# Patient Record
Sex: MALE | Race: WHITE | NOT HISPANIC OR LATINO | Employment: FULL TIME | URBAN - METROPOLITAN AREA
[De-identification: names, ages, dates, MRNs, and addresses within clinical notes are randomized per-mention and may not be internally consistent; named-entity substitution may affect disease eponyms.]

---

## 2023-07-12 LAB
ALANINE AMINOTRANSFERASE (SGPT) (U/L) IN SER/PLAS: 28 U/L (ref 10–52)
ALBUMIN (G/DL) IN SER/PLAS: 4.3 G/DL (ref 3.4–5)
ALKALINE PHOSPHATASE (U/L) IN SER/PLAS: 83 U/L (ref 33–136)
ANION GAP IN SER/PLAS: 13 MMOL/L (ref 10–20)
ASPARTATE AMINOTRANSFERASE (SGOT) (U/L) IN SER/PLAS: 14 U/L (ref 9–39)
BILIRUBIN TOTAL (MG/DL) IN SER/PLAS: 0.3 MG/DL (ref 0–1.2)
CALCIUM (MG/DL) IN SER/PLAS: 10.1 MG/DL (ref 8.6–10.3)
CARBON DIOXIDE, TOTAL (MMOL/L) IN SER/PLAS: 26 MMOL/L (ref 21–32)
CHLORIDE (MMOL/L) IN SER/PLAS: 105 MMOL/L (ref 98–107)
CHOLESTEROL (MG/DL) IN SER/PLAS: 157 MG/DL (ref 0–199)
CHOLESTEROL IN HDL (MG/DL) IN SER/PLAS: 45.7 MG/DL
CHOLESTEROL/HDL RATIO: 3.4
CREATININE (MG/DL) IN SER/PLAS: 0.77 MG/DL (ref 0.5–1.3)
ERYTHROCYTE DISTRIBUTION WIDTH (RATIO) BY AUTOMATED COUNT: 13.7 % (ref 11.5–14.5)
ERYTHROCYTE MEAN CORPUSCULAR HEMOGLOBIN CONCENTRATION (G/DL) BY AUTOMATED: 31.7 G/DL (ref 32–36)
ERYTHROCYTE MEAN CORPUSCULAR VOLUME (FL) BY AUTOMATED COUNT: 91 FL (ref 80–100)
ERYTHROCYTES (10*6/UL) IN BLOOD BY AUTOMATED COUNT: 4.61 X10E12/L (ref 4.5–5.9)
ESTIMATED AVERAGE GLUCOSE FOR HBA1C: 143 MG/DL
GFR MALE: >90 ML/MIN/1.73M2
GLUCOSE (MG/DL) IN SER/PLAS: 105 MG/DL (ref 74–99)
HEMATOCRIT (%) IN BLOOD BY AUTOMATED COUNT: 41.9 % (ref 41–52)
HEMOGLOBIN (G/DL) IN BLOOD: 13.3 G/DL (ref 13.5–17.5)
HEMOGLOBIN A1C/HEMOGLOBIN TOTAL IN BLOOD: 6.6 %
LDL: 58 MG/DL (ref 0–99)
LEUKOCYTES (10*3/UL) IN BLOOD BY AUTOMATED COUNT: 9.5 X10E9/L (ref 4.4–11.3)
NON HDL CHOLESTEROL: 111 MG/DL
NRBC (PER 100 WBCS) BY AUTOMATED COUNT: 0 /100 WBC (ref 0–0)
PLATELETS (10*3/UL) IN BLOOD AUTOMATED COUNT: 384 X10E9/L (ref 150–450)
POTASSIUM (MMOL/L) IN SER/PLAS: 4.5 MMOL/L (ref 3.5–5.3)
PROSTATE SPECIFIC ANTIGEN,SCREEN: 0.23 NG/ML (ref 0–4)
PROTEIN TOTAL: 7.4 G/DL (ref 6.4–8.2)
SODIUM (MMOL/L) IN SER/PLAS: 139 MMOL/L (ref 136–145)
TRIGLYCERIDE (MG/DL) IN SER/PLAS: 267 MG/DL (ref 0–149)
UREA NITROGEN (MG/DL) IN SER/PLAS: 16 MG/DL (ref 6–23)
VLDL: 53 MG/DL (ref 0–40)

## 2024-01-08 DIAGNOSIS — E11.9 TYPE 2 DIABETES MELLITUS WITHOUT COMPLICATION, WITHOUT LONG-TERM CURRENT USE OF INSULIN (MULTI): ICD-10-CM

## 2024-01-08 DIAGNOSIS — E11.9 TYPE 2 DIABETES MELLITUS WITHOUT COMPLICATION, WITHOUT LONG-TERM CURRENT USE OF INSULIN (MULTI): Primary | ICD-10-CM

## 2024-01-08 RX ORDER — BLOOD-GLUCOSE METER
KIT MISCELLANEOUS
Qty: 100 STRIP | Refills: 3 | Status: SHIPPED | OUTPATIENT
Start: 2024-01-08 | End: 2024-01-08 | Stop reason: SDUPTHER

## 2024-01-08 RX ORDER — BLOOD-GLUCOSE METER
KIT MISCELLANEOUS
Qty: 100 STRIP | Refills: 3 | Status: ON HOLD | OUTPATIENT
Start: 2024-01-08

## 2024-01-19 ENCOUNTER — TELEPHONE (OUTPATIENT)
Dept: PRIMARY CARE | Facility: CLINIC | Age: 59
End: 2024-01-19
Payer: COMMERCIAL

## 2024-01-19 DIAGNOSIS — Z12.11 SCREENING FOR COLON CANCER: Primary | ICD-10-CM

## 2024-01-23 ENCOUNTER — TELEPHONE (OUTPATIENT)
Dept: GASTROENTEROLOGY | Facility: HOSPITAL | Age: 59
End: 2024-01-23
Payer: COMMERCIAL

## 2024-01-23 DIAGNOSIS — Z12.11 ENCOUNTER FOR SCREENING FOR MALIGNANT NEOPLASM OF COLON: Primary | ICD-10-CM

## 2024-01-23 RX ORDER — SODIUM, POTASSIUM,MAG SULFATES 17.5-3.13G
1 SOLUTION, RECONSTITUTED, ORAL ORAL 2 TIMES DAILY
Qty: 354 ML | Refills: 0 | Status: SHIPPED
Start: 2024-01-23 | End: 2024-05-31 | Stop reason: WASHOUT

## 2024-01-23 NOTE — TELEPHONE ENCOUNTER
----- Message from Pao Cardenas sent at 1/19/2024  3:30 PM EST -----  Regarding: Prep request  Pt scheduled Colonoscopy on 07/11 with Dr mcbride. Can you please send prep to [facility] pharmacy [phone]      Thank you

## 2024-05-31 ENCOUNTER — OFFICE VISIT (OUTPATIENT)
Dept: PRIMARY CARE | Facility: CLINIC | Age: 59
End: 2024-05-31
Payer: COMMERCIAL

## 2024-05-31 VITALS
HEIGHT: 72 IN | WEIGHT: 223 LBS | BODY MASS INDEX: 30.2 KG/M2 | OXYGEN SATURATION: 95 % | DIASTOLIC BLOOD PRESSURE: 82 MMHG | SYSTOLIC BLOOD PRESSURE: 127 MMHG | HEART RATE: 103 BPM

## 2024-05-31 DIAGNOSIS — R22.9 LUMP OF SKIN: Primary | ICD-10-CM

## 2024-05-31 PROBLEM — I10 BENIGN ESSENTIAL HYPERTENSION: Status: ACTIVE | Noted: 2024-05-31

## 2024-05-31 PROBLEM — E78.5 HYPERLIPIDEMIA: Status: ACTIVE | Noted: 2024-05-31

## 2024-05-31 PROBLEM — E11.9 TYPE 2 DIABETES MELLITUS WITHOUT COMPLICATION, WITHOUT LONG-TERM CURRENT USE OF INSULIN (MULTI): Status: ACTIVE | Noted: 2024-05-31

## 2024-05-31 PROCEDURE — 1036F TOBACCO NON-USER: CPT | Performed by: INTERNAL MEDICINE

## 2024-05-31 PROCEDURE — 3079F DIAST BP 80-89 MM HG: CPT | Performed by: INTERNAL MEDICINE

## 2024-05-31 PROCEDURE — 4010F ACE/ARB THERAPY RXD/TAKEN: CPT | Performed by: INTERNAL MEDICINE

## 2024-05-31 PROCEDURE — 3074F SYST BP LT 130 MM HG: CPT | Performed by: INTERNAL MEDICINE

## 2024-05-31 PROCEDURE — 99213 OFFICE O/P EST LOW 20 MIN: CPT | Performed by: INTERNAL MEDICINE

## 2024-05-31 RX ORDER — MELOXICAM 15 MG/1
1 TABLET ORAL DAILY
Status: ON HOLD | COMMUNITY
Start: 2022-04-04 | End: 2024-06-10 | Stop reason: ALTCHOICE

## 2024-05-31 RX ORDER — METFORMIN HYDROCHLORIDE 500 MG/1
1 TABLET ORAL 2 TIMES DAILY
Status: ON HOLD | COMMUNITY
Start: 2018-01-11

## 2024-05-31 RX ORDER — ASCORBIC ACID 500 MG
500 TABLET ORAL
Status: ON HOLD | COMMUNITY
End: 2024-06-10 | Stop reason: ALTCHOICE

## 2024-05-31 RX ORDER — LANSOPRAZOLE 30 MG/1
30 CAPSULE, DELAYED RELEASE ORAL
Status: ON HOLD | COMMUNITY
End: 2024-06-10 | Stop reason: ALTCHOICE

## 2024-05-31 RX ORDER — LISINOPRIL 20 MG/1
1 TABLET ORAL DAILY
Status: ON HOLD | COMMUNITY
Start: 2016-11-23

## 2024-05-31 RX ORDER — ATORVASTATIN CALCIUM 80 MG/1
1 TABLET, FILM COATED ORAL DAILY
Status: ON HOLD | COMMUNITY
Start: 2015-06-24

## 2024-05-31 ASSESSMENT — PATIENT HEALTH QUESTIONNAIRE - PHQ9
1. LITTLE INTEREST OR PLEASURE IN DOING THINGS: NOT AT ALL
SUM OF ALL RESPONSES TO PHQ9 QUESTIONS 1 & 2: 0
2. FEELING DOWN, DEPRESSED OR HOPELESS: NOT AT ALL

## 2024-05-31 NOTE — PROGRESS NOTES
Subjective   Patient ID: Saul Colon is a 59 y.o. male who presents for Mass (Lump on side).    HPI   Has lump over left side starting about 5 days ago. Felt like it was a boil at first, but didn't come to a head. Tried some warm compresses, didn't help.  The next few days seemed to enlarge. But it is nontender. Not warm to touch.  No fevers. The past two days it has not changed in size. Remains nontender. No fevers.      Review of Systems    Objective   /82 (BP Location: Right arm)   Pulse 103   Ht 1.829 m (6')   Wt 101 kg (223 lb)   SpO2 95%   BMI 30.24 kg/m²     Physical Exam  ~2cm x 4cm firm subcutaneous lump R lateral side just adjacent to lowest rib. Lump is without induration, fluctuance, warmth, or overlying redness.  Difficult to tell if lump is adhered to lower rib as it is minimally mobile. It is nontender.    Assessment/Plan   Problem List Items Addressed This Visit             ICD-10-CM    Lump of skin - Primary R22.9    Relevant Orders    US abdomen limited          R lateral abdominal lump - Unclear. This does not look like an abscess. Will get an ultrasound. Advised to monitor, if enlarging, getting warm or tender, or fevers, then go to the ED in the mean time.

## 2024-06-01 ENCOUNTER — HOSPITAL ENCOUNTER (OUTPATIENT)
Dept: RADIOLOGY | Facility: HOSPITAL | Age: 59
Discharge: HOME | End: 2024-06-01
Payer: COMMERCIAL

## 2024-06-01 DIAGNOSIS — R22.9 LUMP OF SKIN: ICD-10-CM

## 2024-06-01 PROCEDURE — 76705 ECHO EXAM OF ABDOMEN: CPT

## 2024-06-01 PROCEDURE — 76705 ECHO EXAM OF ABDOMEN: CPT | Performed by: RADIOLOGY

## 2024-06-04 ENCOUNTER — APPOINTMENT (OUTPATIENT)
Dept: RADIOLOGY | Facility: CLINIC | Age: 59
End: 2024-06-04
Payer: COMMERCIAL

## 2024-06-04 DIAGNOSIS — R22.9 LUMP OF SKIN: Primary | ICD-10-CM

## 2024-06-05 ENCOUNTER — TELEPHONE (OUTPATIENT)
Dept: PRIMARY CARE | Facility: CLINIC | Age: 59
End: 2024-06-05
Payer: COMMERCIAL

## 2024-06-05 NOTE — TELEPHONE ENCOUNTER
Spoke to patient to give results : per dr maxwell Ultrasound didn't completely show what the lump could be and the Radiologist is recommending getting a CT. I ordered the CT, this will be with contrast so we will need to get blood work to verify kidneys are normal for the contrast, the blood work was ordered too.

## 2024-06-05 NOTE — TELEPHONE ENCOUNTER
----- Message from Josse Al MD sent at 6/4/2024  5:18 PM EDT -----  Ultrasound didn't completely show what the lump could be and the Radiologist is recommending getting a CT. I ordered the CT, this will be with contrast so we will need to get blood work to verify kidneys are normal for the contrast, the blood work was ordered too.

## 2024-06-06 ENCOUNTER — LAB (OUTPATIENT)
Dept: LAB | Facility: LAB | Age: 59
End: 2024-06-06
Payer: COMMERCIAL

## 2024-06-06 DIAGNOSIS — R22.9 LUMP OF SKIN: ICD-10-CM

## 2024-06-06 LAB
CREAT SERPL-MCNC: 0.8 MG/DL (ref 0.5–1.3)
EGFRCR SERPLBLD CKD-EPI 2021: >90 ML/MIN/1.73M*2

## 2024-06-06 PROCEDURE — 82565 ASSAY OF CREATININE: CPT

## 2024-06-06 PROCEDURE — 36415 COLL VENOUS BLD VENIPUNCTURE: CPT

## 2024-06-09 ENCOUNTER — APPOINTMENT (OUTPATIENT)
Dept: RADIOLOGY | Facility: HOSPITAL | Age: 59
End: 2024-06-09
Payer: COMMERCIAL

## 2024-06-09 ENCOUNTER — HOSPITAL ENCOUNTER (INPATIENT)
Facility: HOSPITAL | Age: 59
LOS: 5 days | Discharge: HOME | End: 2024-06-14
Attending: EMERGENCY MEDICINE | Admitting: STUDENT IN AN ORGANIZED HEALTH CARE EDUCATION/TRAINING PROGRAM
Payer: COMMERCIAL

## 2024-06-09 DIAGNOSIS — L02.213 CHEST WALL ABSCESS: Primary | ICD-10-CM

## 2024-06-09 DIAGNOSIS — I70.90 ATHEROSCLEROSIS: ICD-10-CM

## 2024-06-09 DIAGNOSIS — R91.1 LUNG NODULE: ICD-10-CM

## 2024-06-09 LAB
ALBUMIN SERPL BCP-MCNC: 4.7 G/DL (ref 3.4–5)
ALP SERPL-CCNC: 86 U/L (ref 33–136)
ALT SERPL W P-5'-P-CCNC: 27 U/L (ref 10–52)
ANION GAP SERPL CALC-SCNC: 15 MMOL/L (ref 10–20)
AST SERPL W P-5'-P-CCNC: 18 U/L (ref 9–39)
BASOPHILS # BLD AUTO: 0.05 X10*3/UL (ref 0–0.1)
BASOPHILS NFR BLD AUTO: 0.5 %
BILIRUB SERPL-MCNC: 0.4 MG/DL (ref 0–1.2)
BUN SERPL-MCNC: 17 MG/DL (ref 6–23)
CALCIUM SERPL-MCNC: 10.1 MG/DL (ref 8.6–10.3)
CHLORIDE SERPL-SCNC: 98 MMOL/L (ref 98–107)
CO2 SERPL-SCNC: 26 MMOL/L (ref 21–32)
CREAT SERPL-MCNC: 0.93 MG/DL (ref 0.5–1.3)
EGFRCR SERPLBLD CKD-EPI 2021: >90 ML/MIN/1.73M*2
EOSINOPHIL # BLD AUTO: 0.17 X10*3/UL (ref 0–0.7)
EOSINOPHIL NFR BLD AUTO: 1.6 %
ERYTHROCYTE [DISTWIDTH] IN BLOOD BY AUTOMATED COUNT: 13.9 % (ref 11.5–14.5)
GLUCOSE SERPL-MCNC: 97 MG/DL (ref 74–99)
HCT VFR BLD AUTO: 40.4 % (ref 41–52)
HGB BLD-MCNC: 13.3 G/DL (ref 13.5–17.5)
IMM GRANULOCYTES # BLD AUTO: 0.03 X10*3/UL (ref 0–0.7)
IMM GRANULOCYTES NFR BLD AUTO: 0.3 % (ref 0–0.9)
LACTATE SERPL-SCNC: 1.8 MMOL/L (ref 0.4–2)
LYMPHOCYTES # BLD AUTO: 2.35 X10*3/UL (ref 1.2–4.8)
LYMPHOCYTES NFR BLD AUTO: 21.5 %
MCH RBC QN AUTO: 29.3 PG (ref 26–34)
MCHC RBC AUTO-ENTMCNC: 32.9 G/DL (ref 32–36)
MCV RBC AUTO: 89 FL (ref 80–100)
MONOCYTES # BLD AUTO: 0.92 X10*3/UL (ref 0.1–1)
MONOCYTES NFR BLD AUTO: 8.4 %
NEUTROPHILS # BLD AUTO: 7.41 X10*3/UL (ref 1.2–7.7)
NEUTROPHILS NFR BLD AUTO: 67.7 %
NRBC BLD-RTO: 0 /100 WBCS (ref 0–0)
PLATELET # BLD AUTO: 471 X10*3/UL (ref 150–450)
POTASSIUM SERPL-SCNC: 4.3 MMOL/L (ref 3.5–5.3)
PROT SERPL-MCNC: 8.2 G/DL (ref 6.4–8.2)
RBC # BLD AUTO: 4.54 X10*6/UL (ref 4.5–5.9)
SODIUM SERPL-SCNC: 135 MMOL/L (ref 136–145)
WBC # BLD AUTO: 10.9 X10*3/UL (ref 4.4–11.3)

## 2024-06-09 PROCEDURE — 74177 CT ABD & PELVIS W/CONTRAST: CPT | Mod: FOREIGN READ | Performed by: RADIOLOGY

## 2024-06-09 PROCEDURE — 1210000001 HC SEMI-PRIVATE ROOM DAILY

## 2024-06-09 PROCEDURE — 2550000001 HC RX 255 CONTRASTS: Performed by: NURSE PRACTITIONER

## 2024-06-09 PROCEDURE — 74177 CT ABD & PELVIS W/CONTRAST: CPT

## 2024-06-09 PROCEDURE — 2500000004 HC RX 250 GENERAL PHARMACY W/ HCPCS (ALT 636 FOR OP/ED): Performed by: EMERGENCY MEDICINE

## 2024-06-09 PROCEDURE — 85025 COMPLETE CBC W/AUTO DIFF WBC: CPT | Performed by: NURSE PRACTITIONER

## 2024-06-09 PROCEDURE — 36415 COLL VENOUS BLD VENIPUNCTURE: CPT | Performed by: NURSE PRACTITIONER

## 2024-06-09 PROCEDURE — 83605 ASSAY OF LACTIC ACID: CPT | Performed by: NURSE PRACTITIONER

## 2024-06-09 PROCEDURE — 80053 COMPREHEN METABOLIC PANEL: CPT | Performed by: NURSE PRACTITIONER

## 2024-06-09 PROCEDURE — 99223 1ST HOSP IP/OBS HIGH 75: CPT | Performed by: STUDENT IN AN ORGANIZED HEALTH CARE EDUCATION/TRAINING PROGRAM

## 2024-06-09 PROCEDURE — 83036 HEMOGLOBIN GLYCOSYLATED A1C: CPT | Performed by: STUDENT IN AN ORGANIZED HEALTH CARE EDUCATION/TRAINING PROGRAM

## 2024-06-09 PROCEDURE — 96365 THER/PROPH/DIAG IV INF INIT: CPT

## 2024-06-09 PROCEDURE — 87040 BLOOD CULTURE FOR BACTERIA: CPT | Mod: PARLAB | Performed by: NURSE PRACTITIONER

## 2024-06-09 PROCEDURE — 99285 EMERGENCY DEPT VISIT HI MDM: CPT | Mod: 25

## 2024-06-09 RX ORDER — DEXTROSE 50 % IN WATER (D50W) INTRAVENOUS SYRINGE
12.5
Status: DISCONTINUED | OUTPATIENT
Start: 2024-06-09 | End: 2024-06-14 | Stop reason: HOSPADM

## 2024-06-09 RX ORDER — ENOXAPARIN SODIUM 100 MG/ML
40 INJECTION SUBCUTANEOUS EVERY 24 HOURS
Status: DISCONTINUED | OUTPATIENT
Start: 2024-06-09 | End: 2024-06-14 | Stop reason: HOSPADM

## 2024-06-09 RX ORDER — VANCOMYCIN HYDROCHLORIDE 1 G/20ML
INJECTION, POWDER, LYOPHILIZED, FOR SOLUTION INTRAVENOUS DAILY PRN
Status: DISCONTINUED | OUTPATIENT
Start: 2024-06-09 | End: 2024-06-09

## 2024-06-09 RX ORDER — PANTOPRAZOLE SODIUM 20 MG/1
20 TABLET, DELAYED RELEASE ORAL DAILY
Status: DISCONTINUED | OUTPATIENT
Start: 2024-06-10 | End: 2024-06-14 | Stop reason: HOSPADM

## 2024-06-09 RX ORDER — VANCOMYCIN HYDROCHLORIDE 1 G/20ML
INJECTION, POWDER, LYOPHILIZED, FOR SOLUTION INTRAVENOUS DAILY PRN
Status: DISCONTINUED | OUTPATIENT
Start: 2024-06-09 | End: 2024-06-14 | Stop reason: HOSPADM

## 2024-06-09 RX ORDER — ATORVASTATIN CALCIUM 80 MG/1
80 TABLET, FILM COATED ORAL DAILY
Status: DISCONTINUED | OUTPATIENT
Start: 2024-06-10 | End: 2024-06-14 | Stop reason: HOSPADM

## 2024-06-09 RX ORDER — INSULIN LISPRO 100 [IU]/ML
0-5 INJECTION, SOLUTION INTRAVENOUS; SUBCUTANEOUS
Status: DISCONTINUED | OUTPATIENT
Start: 2024-06-10 | End: 2024-06-14 | Stop reason: HOSPADM

## 2024-06-09 RX ORDER — IBUPROFEN 400 MG/1
400 TABLET ORAL EVERY 6 HOURS PRN
Status: DISCONTINUED | OUTPATIENT
Start: 2024-06-09 | End: 2024-06-14 | Stop reason: HOSPADM

## 2024-06-09 RX ORDER — DEXTROSE 50 % IN WATER (D50W) INTRAVENOUS SYRINGE
25
Status: DISCONTINUED | OUTPATIENT
Start: 2024-06-09 | End: 2024-06-14 | Stop reason: HOSPADM

## 2024-06-09 RX ORDER — ONDANSETRON HYDROCHLORIDE 2 MG/ML
4 INJECTION, SOLUTION INTRAVENOUS EVERY 6 HOURS PRN
Status: DISCONTINUED | OUTPATIENT
Start: 2024-06-09 | End: 2024-06-14 | Stop reason: HOSPADM

## 2024-06-09 RX ORDER — ACETAMINOPHEN 325 MG/1
650 TABLET ORAL EVERY 4 HOURS PRN
Status: DISCONTINUED | OUTPATIENT
Start: 2024-06-09 | End: 2024-06-14 | Stop reason: HOSPADM

## 2024-06-09 RX ORDER — LISINOPRIL 20 MG/1
20 TABLET ORAL DAILY
Status: DISCONTINUED | OUTPATIENT
Start: 2024-06-10 | End: 2024-06-14 | Stop reason: HOSPADM

## 2024-06-09 RX ADMIN — IOHEXOL 80 ML: 350 INJECTION, SOLUTION INTRAVENOUS at 17:44

## 2024-06-09 RX ADMIN — PIPERACILLIN SODIUM AND TAZOBACTAM SODIUM 4.5 G: 4; .5 INJECTION, SOLUTION INTRAVENOUS at 21:31

## 2024-06-09 ASSESSMENT — LIFESTYLE VARIABLES
HAVE PEOPLE ANNOYED YOU BY CRITICIZING YOUR DRINKING: NO
EVER FELT BAD OR GUILTY ABOUT YOUR DRINKING: NO
EVER HAD A DRINK FIRST THING IN THE MORNING TO STEADY YOUR NERVES TO GET RID OF A HANGOVER: NO
TOTAL SCORE: 0
HAVE YOU EVER FELT YOU SHOULD CUT DOWN ON YOUR DRINKING: NO

## 2024-06-09 ASSESSMENT — COLUMBIA-SUICIDE SEVERITY RATING SCALE - C-SSRS
1. IN THE PAST MONTH, HAVE YOU WISHED YOU WERE DEAD OR WISHED YOU COULD GO TO SLEEP AND NOT WAKE UP?: NO
2. HAVE YOU ACTUALLY HAD ANY THOUGHTS OF KILLING YOURSELF?: NO
6. HAVE YOU EVER DONE ANYTHING, STARTED TO DO ANYTHING, OR PREPARED TO DO ANYTHING TO END YOUR LIFE?: NO

## 2024-06-09 ASSESSMENT — PAIN SCALES - GENERAL: PAINLEVEL_OUTOF10: 4

## 2024-06-09 ASSESSMENT — PAIN - FUNCTIONAL ASSESSMENT: PAIN_FUNCTIONAL_ASSESSMENT: 0-10

## 2024-06-09 NOTE — ED TRIAGE NOTES
TRIAGE NOTE   I saw the patient as the Clinician in Triage and performed a brief history and physical exam, established acuity, and ordered appropriate tests to develop basic plan of care. Patient will be seen by an RUTHIE, resident and/or physician who will independently evaluate the patient. Please see subsequent provider notes for further details and disposition.     Brief HPI: In brief, Saul Colon is a 59 y.o. male with significant past medical history for hypertension, hyperlipidemia and prediabetes on metformin presenting to ED today from home with wife for evaluation of a mass on the right side of the torso.  For the last week the patient had a swollen area on the right lateral trunk, ultrasound showed a mass and recommended CT that is scheduled for later this week.  Presenting to ED as it has become more erythematous and sensitive.  Denies fever/chills, cough/cold symptoms, chest pain, shortness of breath, nausea/vomiting, abdominal pain, urinary symptoms, change in bowel habits or any other complaints.  No smoking or IV drugs.  Occasional EtOH.    Focused Physical exam:   General: 59-year-old  male, awake and alert, oriented x 3.  Nontoxic looking.  Well-nourished and hydrated.  Skin: Erythematous warm area on the right lateral abdomen, approximately 4 x 5 cm, slightly tender to touch.  Cardiac: Mildly tachycardic at 105, regular rhythm.  Pulmonary: Lungs clear bilaterally.  Abdomen: Rounded and soft with bowel sounds.    Plan/MDM:   59-year-old male with known HTN, HLD and prediabetes is evaluated at the bedside for a swollen erythematous area on the right lateral abdomen worsening over the last week.  Ultrasound was concerning for mass, CT scheduled for this week.  On arrival to the ED, blood pressure 150/85, tachycardic at a rate of 105, patient is not hypoxic or febrile at this time.  IV established, basic labs including lactate/blood cultures and CT abdomen/pelvis with contrast will be  performed.  Patient agreeable with this plan    Please see subsequent provider note for further details and disposition

## 2024-06-09 NOTE — LETTER
June 14, 2024     Patient: Saul Colon   YOB: 1965   Date of Visit: 6/9/2024       To Whom It May Concern:    Saul Colon was seen in my clinic on 6/9/2024 at . Please excuse Saul for his absence from work until 6/16/2024.     If you have any questions or concerns, please don't hesitate to call.         Sincerely,         Gris Ibanez MD

## 2024-06-09 NOTE — ED TRIAGE NOTES
Pt to ER via triage, c/o a cyst on this right flank/ribcage area. Pt states he's had it for at least 10 days, and it's very red, warm and tender to the touch.

## 2024-06-10 LAB
ANION GAP SERPL CALC-SCNC: 12 MMOL/L (ref 10–20)
BASOPHILS # BLD AUTO: 0.04 X10*3/UL (ref 0–0.1)
BASOPHILS NFR BLD AUTO: 0.5 %
BUN SERPL-MCNC: 15 MG/DL (ref 6–23)
CALCIUM SERPL-MCNC: 9.6 MG/DL (ref 8.6–10.3)
CHLORIDE SERPL-SCNC: 99 MMOL/L (ref 98–107)
CO2 SERPL-SCNC: 28 MMOL/L (ref 21–32)
CREAT SERPL-MCNC: 0.9 MG/DL (ref 0.5–1.3)
EGFRCR SERPLBLD CKD-EPI 2021: >90 ML/MIN/1.73M*2
EOSINOPHIL # BLD AUTO: 0.25 X10*3/UL (ref 0–0.7)
EOSINOPHIL NFR BLD AUTO: 3.1 %
ERYTHROCYTE [DISTWIDTH] IN BLOOD BY AUTOMATED COUNT: 13.8 % (ref 11.5–14.5)
EST. AVERAGE GLUCOSE BLD GHB EST-MCNC: 143 MG/DL
GLUCOSE BLD MANUAL STRIP-MCNC: 104 MG/DL (ref 74–99)
GLUCOSE BLD MANUAL STRIP-MCNC: 131 MG/DL (ref 74–99)
GLUCOSE BLD MANUAL STRIP-MCNC: 139 MG/DL (ref 74–99)
GLUCOSE BLD MANUAL STRIP-MCNC: 142 MG/DL (ref 74–99)
GLUCOSE SERPL-MCNC: 105 MG/DL (ref 74–99)
HBA1C MFR BLD: 6.6 %
HCT VFR BLD AUTO: 38 % (ref 41–52)
HGB BLD-MCNC: 12.9 G/DL (ref 13.5–17.5)
IMM GRANULOCYTES # BLD AUTO: 0.03 X10*3/UL (ref 0–0.7)
IMM GRANULOCYTES NFR BLD AUTO: 0.4 % (ref 0–0.9)
LYMPHOCYTES # BLD AUTO: 2.12 X10*3/UL (ref 1.2–4.8)
LYMPHOCYTES NFR BLD AUTO: 26.2 %
MCH RBC QN AUTO: 29.7 PG (ref 26–34)
MCHC RBC AUTO-ENTMCNC: 33.9 G/DL (ref 32–36)
MCV RBC AUTO: 88 FL (ref 80–100)
MONOCYTES # BLD AUTO: 0.84 X10*3/UL (ref 0.1–1)
MONOCYTES NFR BLD AUTO: 10.4 %
NEUTROPHILS # BLD AUTO: 4.82 X10*3/UL (ref 1.2–7.7)
NEUTROPHILS NFR BLD AUTO: 59.4 %
NRBC BLD-RTO: 0 /100 WBCS (ref 0–0)
PLATELET # BLD AUTO: 388 X10*3/UL (ref 150–450)
POTASSIUM SERPL-SCNC: 4 MMOL/L (ref 3.5–5.3)
RBC # BLD AUTO: 4.34 X10*6/UL (ref 4.5–5.9)
SODIUM SERPL-SCNC: 135 MMOL/L (ref 136–145)
WBC # BLD AUTO: 8.1 X10*3/UL (ref 4.4–11.3)

## 2024-06-10 PROCEDURE — 1200000002 HC GENERAL ROOM WITH TELEMETRY DAILY

## 2024-06-10 PROCEDURE — 2500000004 HC RX 250 GENERAL PHARMACY W/ HCPCS (ALT 636 FOR OP/ED): Mod: JZ | Performed by: EMERGENCY MEDICINE

## 2024-06-10 PROCEDURE — 2500000004 HC RX 250 GENERAL PHARMACY W/ HCPCS (ALT 636 FOR OP/ED)

## 2024-06-10 PROCEDURE — 99222 1ST HOSP IP/OBS MODERATE 55: CPT | Performed by: SURGERY

## 2024-06-10 PROCEDURE — 0J960ZZ DRAINAGE OF CHEST SUBCUTANEOUS TISSUE AND FASCIA, OPEN APPROACH: ICD-10-PCS | Performed by: SURGERY

## 2024-06-10 PROCEDURE — 80048 BASIC METABOLIC PNL TOTAL CA: CPT | Performed by: STUDENT IN AN ORGANIZED HEALTH CARE EDUCATION/TRAINING PROGRAM

## 2024-06-10 PROCEDURE — 99232 SBSQ HOSP IP/OBS MODERATE 35: CPT | Performed by: STUDENT IN AN ORGANIZED HEALTH CARE EDUCATION/TRAINING PROGRAM

## 2024-06-10 PROCEDURE — 2500000001 HC RX 250 WO HCPCS SELF ADMINISTERED DRUGS (ALT 637 FOR MEDICARE OP): Performed by: STUDENT IN AN ORGANIZED HEALTH CARE EDUCATION/TRAINING PROGRAM

## 2024-06-10 PROCEDURE — 85025 COMPLETE CBC W/AUTO DIFF WBC: CPT | Performed by: STUDENT IN AN ORGANIZED HEALTH CARE EDUCATION/TRAINING PROGRAM

## 2024-06-10 PROCEDURE — 2500000004 HC RX 250 GENERAL PHARMACY W/ HCPCS (ALT 636 FOR OP/ED): Performed by: STUDENT IN AN ORGANIZED HEALTH CARE EDUCATION/TRAINING PROGRAM

## 2024-06-10 PROCEDURE — 87205 SMEAR GRAM STAIN: CPT | Mod: PARLAB | Performed by: REGISTERED NURSE

## 2024-06-10 PROCEDURE — 36415 COLL VENOUS BLD VENIPUNCTURE: CPT | Performed by: STUDENT IN AN ORGANIZED HEALTH CARE EDUCATION/TRAINING PROGRAM

## 2024-06-10 PROCEDURE — 10061 I&D ABSCESS COMP/MULTIPLE: CPT | Performed by: SURGERY

## 2024-06-10 PROCEDURE — 82947 ASSAY GLUCOSE BLOOD QUANT: CPT

## 2024-06-10 RX ORDER — MORPHINE SULFATE 4 MG/ML
INJECTION, SOLUTION INTRAMUSCULAR; INTRAVENOUS
Status: COMPLETED
Start: 2024-06-10 | End: 2024-06-10

## 2024-06-10 RX ORDER — MORPHINE SULFATE 4 MG/ML
4 INJECTION, SOLUTION INTRAMUSCULAR; INTRAVENOUS ONCE
Status: COMPLETED | OUTPATIENT
Start: 2024-06-10 | End: 2024-06-10

## 2024-06-10 RX ADMIN — VANCOMYCIN HYDROCHLORIDE 1250 MG: 1.25 INJECTION, POWDER, LYOPHILIZED, FOR SOLUTION INTRAVENOUS at 17:42

## 2024-06-10 RX ADMIN — LISINOPRIL 20 MG: 20 TABLET ORAL at 12:24

## 2024-06-10 RX ADMIN — MORPHINE SULFATE 4 MG: 4 INJECTION, SOLUTION INTRAMUSCULAR; INTRAVENOUS at 10:07

## 2024-06-10 RX ADMIN — VANCOMYCIN HYDROCHLORIDE 1500 MG: 1.5 INJECTION, POWDER, LYOPHILIZED, FOR SOLUTION INTRAVENOUS at 00:19

## 2024-06-10 RX ADMIN — PIPERACILLIN SODIUM AND TAZOBACTAM SODIUM 3.38 G: 3; .375 INJECTION, SOLUTION INTRAVENOUS at 23:54

## 2024-06-10 RX ADMIN — PIPERACILLIN SODIUM AND TAZOBACTAM SODIUM 3.38 G: 3; .375 INJECTION, SOLUTION INTRAVENOUS at 17:42

## 2024-06-10 RX ADMIN — ENOXAPARIN SODIUM 40 MG: 40 INJECTION SUBCUTANEOUS at 23:54

## 2024-06-10 RX ADMIN — ATORVASTATIN CALCIUM 80 MG: 80 TABLET, FILM COATED ORAL at 12:24

## 2024-06-10 RX ADMIN — PIPERACILLIN SODIUM AND TAZOBACTAM SODIUM 3.38 G: 3; .375 INJECTION, SOLUTION INTRAVENOUS at 04:34

## 2024-06-10 RX ADMIN — ENOXAPARIN SODIUM 40 MG: 40 INJECTION SUBCUTANEOUS at 00:17

## 2024-06-10 RX ADMIN — PIPERACILLIN SODIUM AND TAZOBACTAM SODIUM 3.38 G: 3; .375 INJECTION, SOLUTION INTRAVENOUS at 10:52

## 2024-06-10 SDOH — SOCIAL STABILITY: SOCIAL INSECURITY: ARE THERE ANY APPARENT SIGNS OF INJURIES/BEHAVIORS THAT COULD BE RELATED TO ABUSE/NEGLECT?: NO

## 2024-06-10 SDOH — SOCIAL STABILITY: SOCIAL INSECURITY: DO YOU FEEL UNSAFE GOING BACK TO THE PLACE WHERE YOU ARE LIVING?: NO

## 2024-06-10 SDOH — SOCIAL STABILITY: SOCIAL INSECURITY: HAS ANYONE EVER THREATENED TO HURT YOUR FAMILY OR YOUR PETS?: NO

## 2024-06-10 SDOH — SOCIAL STABILITY: SOCIAL INSECURITY: WERE YOU ABLE TO COMPLETE ALL THE BEHAVIORAL HEALTH SCREENINGS?: YES

## 2024-06-10 SDOH — SOCIAL STABILITY: SOCIAL INSECURITY: HAVE YOU HAD THOUGHTS OF HARMING ANYONE ELSE?: NO

## 2024-06-10 SDOH — SOCIAL STABILITY: SOCIAL INSECURITY: ARE YOU OR HAVE YOU BEEN THREATENED OR ABUSED PHYSICALLY, EMOTIONALLY, OR SEXUALLY BY ANYONE?: NO

## 2024-06-10 SDOH — SOCIAL STABILITY: SOCIAL INSECURITY: DO YOU FEEL ANYONE HAS EXPLOITED OR TAKEN ADVANTAGE OF YOU FINANCIALLY OR OF YOUR PERSONAL PROPERTY?: NO

## 2024-06-10 SDOH — SOCIAL STABILITY: SOCIAL INSECURITY: DOES ANYONE TRY TO KEEP YOU FROM HAVING/CONTACTING OTHER FRIENDS OR DOING THINGS OUTSIDE YOUR HOME?: NO

## 2024-06-10 SDOH — SOCIAL STABILITY: SOCIAL INSECURITY: HAVE YOU HAD ANY THOUGHTS OF HARMING ANYONE ELSE?: NO

## 2024-06-10 SDOH — SOCIAL STABILITY: SOCIAL INSECURITY: ABUSE: ADULT

## 2024-06-10 ASSESSMENT — COGNITIVE AND FUNCTIONAL STATUS - GENERAL
CLIMB 3 TO 5 STEPS WITH RAILING: A LITTLE
MOBILITY SCORE: 23
PATIENT BASELINE BEDBOUND: NO
MOBILITY SCORE: 24
DAILY ACTIVITIY SCORE: 24

## 2024-06-10 ASSESSMENT — ACTIVITIES OF DAILY LIVING (ADL)
HEARING - RIGHT EAR: FUNCTIONAL
HEARING - LEFT EAR: FUNCTIONAL
TOILETING: INDEPENDENT
BATHING: INDEPENDENT
WALKS IN HOME: INDEPENDENT
ADEQUATE_TO_COMPLETE_ADL: YES
FEEDING YOURSELF: INDEPENDENT
LACK_OF_TRANSPORTATION: NO
PATIENT'S MEMORY ADEQUATE TO SAFELY COMPLETE DAILY ACTIVITIES?: YES
DRESSING YOURSELF: INDEPENDENT
JUDGMENT_ADEQUATE_SAFELY_COMPLETE_DAILY_ACTIVITIES: YES
GROOMING: INDEPENDENT

## 2024-06-10 ASSESSMENT — LIFESTYLE VARIABLES
HAVE YOU OR SOMEONE ELSE BEEN INJURED AS A RESULT OF YOUR DRINKING: NO
HOW OFTEN DURING THE LAST YEAR HAVE YOU FOUND THAT YOU WERE NOT ABLE TO STOP DRINKING ONCE YOU HAD STARTED: NEVER
HOW OFTEN DURING THE LAST YEAR HAVE YOU NEEDED AN ALCOHOLIC DRINK FIRST THING IN THE MORNING TO GET YOURSELF GOING AFTER A NIGHT OF HEAVY DRINKING: NEVER
HAS A RELATIVE, FRIEND, DOCTOR, OR ANOTHER HEALTH PROFESSIONAL EXPRESSED CONCERN ABOUT YOUR DRINKING OR SUGGESTED YOU CUT DOWN: NO
HOW OFTEN DURING THE LAST YEAR HAVE YOU FAILED TO DO WHAT WAS NORMALLY EXPECTED FROM YOU BECAUSE OF DRINKING: NEVER
HOW OFTEN DO YOU HAVE 6 OR MORE DRINKS ON ONE OCCASION: NEVER
SUBSTANCE_ABUSE_PAST_12_MONTHS: NO
AUDIT-C TOTAL SCORE: 3
HOW OFTEN DURING THE LAST YEAR HAVE YOU BEEN UNABLE TO REMEMBER WHAT HAPPENED THE NIGHT BEFORE BECAUSE YOU HAD BEEN DRINKING: NEVER
HOW OFTEN DO YOU HAVE A DRINK CONTAINING ALCOHOL: 2-3 TIMES A WEEK
AUDIT TOTAL SCORE: 0
PRESCIPTION_ABUSE_PAST_12_MONTHS: NO
HOW OFTEN DURING THE LAST YEAR HAVE YOU HAD A FEELING OF GUILT OR REMORSE AFTER DRINKING: NEVER
AUDIT-C TOTAL SCORE: 3
SKIP TO QUESTIONS 9-10: 1
HOW MANY STANDARD DRINKS CONTAINING ALCOHOL DO YOU HAVE ON A TYPICAL DAY: 1 OR 2
AUDIT TOTAL SCORE: 3

## 2024-06-10 ASSESSMENT — PAIN SCALES - GENERAL
PAINLEVEL_OUTOF10: 0 - NO PAIN

## 2024-06-10 ASSESSMENT — COLUMBIA-SUICIDE SEVERITY RATING SCALE - C-SSRS
6. HAVE YOU EVER DONE ANYTHING, STARTED TO DO ANYTHING, OR PREPARED TO DO ANYTHING TO END YOUR LIFE?: NO
2. HAVE YOU ACTUALLY HAD ANY THOUGHTS OF KILLING YOURSELF?: NO
1. IN THE PAST MONTH, HAVE YOU WISHED YOU WERE DEAD OR WISHED YOU COULD GO TO SLEEP AND NOT WAKE UP?: NO

## 2024-06-10 ASSESSMENT — PATIENT HEALTH QUESTIONNAIRE - PHQ9
1. LITTLE INTEREST OR PLEASURE IN DOING THINGS: NOT AT ALL
2. FEELING DOWN, DEPRESSED OR HOPELESS: NOT AT ALL
SUM OF ALL RESPONSES TO PHQ9 QUESTIONS 1 & 2: 0

## 2024-06-10 ASSESSMENT — PAIN - FUNCTIONAL ASSESSMENT: PAIN_FUNCTIONAL_ASSESSMENT: 0-10

## 2024-06-10 NOTE — CONSULTS
Reason For Consult  Right flank abscess    History Of Present Illness  Saul Colon is a 59 y.o. male who complains of a 10-day history of a right flank tender lump which has enlarged.  The skin has become red.  He started having a bit of drainage from the area yesterday.  No fevers or chills.    Past medical history:  Laparoscopic appendectomy in 2013 for a perforated appendix at [facility].  Perihepatic abscess percutaneously drained in October 2016.  Laparotomy with excision of urachal cyst at [facility] in November 2016.  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), Other conditions influencing health status (08/22/2016), Other shoulder lesions, right shoulder (06/29/2016), Pain in right knee (04/04/2022), Personal history of diseases of the blood and blood-forming organs and certain disorders involving the immune mechanism (04/17/2017), Personal history of diseases of the skin and subcutaneous tissue (08/16/2017), Personal history of other diseases of the digestive system, Personal history of other diseases of the musculoskeletal system and connective tissue (01/19/2015), Personal history of other diseases of the respiratory system (02/15/2016), Plantar fascial fibromatosis (12/12/2014), Postnasal drip (08/12/2016), Rash and other nonspecific skin eruption (11/08/2022), Umbilical hernia with obstruction, without gangrene (08/17/2016), Unspecified otitis externa, unspecified ear (09/21/2018), and Unspecified rotator cuff tear or rupture of right shoulder, not specified as traumatic  (04/17/2017).    Surgical History  He has a past surgical history that includes Colonoscopy (08/02/2017); Vasectomy (08/02/2017); Other surgical history (08/02/2017); Hernia repair (01/08/2014); and Appendectomy (01/08/2014).     Social History  He reports that he has never smoked. He has never used smokeless tobacco. He reports current alcohol use of about 2.0 standard drinks of alcohol per week. He reports that he does not use drugs.    Family History  No family history on file.     Allergies  Patient has no known allergies.     Physical Exam  Constitutional: Well-developed, well-nourished, alert and oriented, no acute distress  Skin: Warm and dry, no lesions, no rashes, no jaundice  HEENT: Normocephalic, atraumatic, EOMI, no scleral icterus, eyes have no redness or swelling or discharge, external inspection of ears and nose is normal, mucous membranes moist  Neck: Soft, nontender, no mass or adenopathy  Cardiac: Regular rate and rhythm, no murmur  Chest: Patent airway, clear to auscultation, normal breath sounds with good chest expansion, no wheezes or rales or rhonchi noted, thorax symmetric.  Right inferior lateral chest wall 4 x 5 cm diameter mass with tenderness and erythema and minimal drainage of purulent fluid.  A skin opening is not visible.  A scar is visible at the superior edge of this mass from his previous drain.  Abdomen: Nondistended, positive bowel sounds, soft, nontender, no mass.  Rectal: Not performed  Extremities: No injury, no lower extremity edema or calf tenderness  Lymphatic: No cervical adenopathy  Musculoskeletal: Range of motion intact, no joint swelling, normal strength  Neurological: Alert and oriented x3, intact sensory and motor function, no obvious focal neurologic abnormalities  Psychological: Appropriate mood and behavior     Last Recorded Vitals  Blood pressure (!) 172/91, pulse 68, temperature 36.5 °C (97.7 °F), temperature source Temporal, resp. rate 20, height 1.829 m (6'),  weight 101 kg (223 lb), SpO2 99%.    Relevant Results  Labs: WBC 8.1, hemoglobin 12.9, platelet 388    I reviewed the June 9, 2024 CT abdomen/pelvis report and images with a radiologist.  IMPRESSION:  Multiple small collections versus with likely mass lesions, at the  right inferior margin of the pleural space extending into the right  lateral peritoneal space and right upper quadrant abdominal wall. The  second largest collection was probably previously visualized by  ultrasound.     I reviewed the Mona 3, 2024 ultrasound report and images.  IMPRESSION:  1.6 x 3.5 x 3.5 cm solid hypoechoic soft tissue mass at the site of  clinical concern along the right side of the upper abdomen with  subcutaneous fat overlying this mass appearing edematous. I would  recommend CT examination of abdomen with contrast administration for  further characterization.    Assessment/Plan   Right lateral chest wall mass/abscess with extension through the chest wall into the right lateral peritoneal space and right inferior pleural space.  This appears to be an abscess.  It is located adjacent to the scar from previous percutaneous drainage of a perihepatic abscess.  Recommend incision and drainage and culture.  This can be performed with a local anesthetic at the bedside.  Patient is agreeable.  Continue broad-spectrum antibiotics until culture available.  Recommend repeat CT scan following drainage and course of antibiotics, to ensure resolution of the deeper extension of the abscess/mass.    Procedure note:  Following informed consent and after administration of morphine 4 mg IV by the patient's nurse incision and drainage of the right inferior lateral chest wall mass was performed with local anesthetic.  The area was prepped with chlorhexidine.  15 mL of 1% lidocaine with epinephrine was infiltrated in and around the mass.  A cruciate skin incision was made directly overlying the mass.  Purulent fluid was drained and cultured.  Patient  was found to have an abscess within the subcutaneous tissue with a deeper extension through the chest wall.  The deeper extension was gently probed to about 6 cm deep to the skin surface.  The wound was packed with gauze and a dressing placed.  The patient tolerated the procedure well with minimal bleeding.    Mook Segovia MD

## 2024-06-10 NOTE — CONSULTS
"Reason For Consult  \"Chest wall abscess\"    History Of Present Illness  Saul Colon is a 59 y.o. male presenting with worsening tenderness, erythema, and drainage from right flank mass. Patient originally saw his PCP Dr. Al on 5/31/24 with concern for developing mass on his right side. Patient initially thought it was a boil, but states that it never came to a head. Per PCP note: \"Lump is without induration, fluctuance, warmth, or overlying redness.\". He ordered Ultrasound to further evaluate.    Patient came home from work last night and noticed that his T-shirt rubbing the area was more tender than it had been in the past. His wife evaluated the area and was concerned it was more red in appearance.  Patient denies fever or chills. Denies malaise. Recalls that when he had fluid \"leaking out of my belly button\" along with the Ecoli abscess (in 2016, noted below) he was much more fatigued/drained. He denies feeling similar symptoms this time.  Denies any drainage from the right flank mass itself; however, there is mild serous drainage upon my evaluation today.      Denies chest pain, SOB, swelling in legs, or systemic rash.    Patient has a history significant for NIDDM Type 2, HTN, HLD, and appendicitis c/b perforation s/p appendectomy (12/2013), c/b perihepatic abscess s/p external SOCORRO drain placement (10/26/16), and urachal cyst remnant s/p open excision on 11/8/16 at [facility].    Patient was admitted to hospitalist service with consults to general surgery and ID. He received Vanc/Zosyn in ED and these have been continued per medicine.      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), Other conditions " influencing health status (08/22/2016), Other shoulder lesions, right shoulder (06/29/2016), Pain in right knee (04/04/2022), Personal history of diseases of the blood and blood-forming organs and certain disorders involving the immune mechanism (04/17/2017), Personal history of diseases of the skin and subcutaneous tissue (08/16/2017), Personal history of other diseases of the digestive system, Personal history of other diseases of the musculoskeletal system and connective tissue (01/19/2015), Personal history of other diseases of the respiratory system (02/15/2016), Plantar fascial fibromatosis (12/12/2014), Postnasal drip (08/12/2016), Rash and other nonspecific skin eruption (11/08/2022), Umbilical hernia with obstruction, without gangrene (08/17/2016), Unspecified otitis externa, unspecified ear (09/21/2018), and Unspecified rotator cuff tear or rupture of right shoulder, not specified as traumatic (04/17/2017).    Surgical History  He has a past surgical history that includes Colonoscopy (08/02/2017); Vasectomy (08/02/2017); Other surgical history (08/02/2017); Hernia repair (01/08/2014); and Appendectomy (01/08/2014).   Right inguinal hernia repair as child    Urachal remnant removal with multiple intraabdominal/bladder biopsies- all negative    Appendectomy as noted above      Social History  He reports that he has never smoked. He has never used smokeless tobacco. He reports current alcohol use of about 2.0 standard drinks of alcohol per week. He reports that he does not use drugs.    Family History  No family history on file.     Allergies  Patient has no known allergies.    Review of Systems  As noted in HPI     Physical Exam:  Constitutional:       Adult male, healthy-appearing, dressed in shorts and T shirt sitting at edge of cart in ED. Able to move from laying to sitting to standing without assistance        HENT:     MMM  Eyes:      sclera white, anicteric   Cardiovascular:      RRR  Pulmonary:      Clear breath sounds, normal chest expansion, room air  Abdominal:      Abdomen round, non tender to palpate, not distended. Diastasis recti noted. Umbilical scar.  Right flank mass- tender, erythematous, indurated with central region of fluctuance with mild serous fluid oozing       Skin:     Warm, no systemic rash, no pallor   Extremities:     No edema  Neurological:      A&O x3  Psychiatric:        Calm        Last Recorded Vitals  Blood pressure (!) 172/91, pulse 68, temperature 36.5 °C (97.7 °F), temperature source Temporal, resp. rate 20, height 1.829 m (6'), weight 101 kg (223 lb), SpO2 99%.    Relevant Results  Results for orders placed or performed during the hospital encounter of 06/09/24 (from the past 24 hour(s))   CBC and Auto Differential   Result Value Ref Range    WBC 10.9 4.4 - 11.3 x10*3/uL    nRBC 0.0 0.0 - 0.0 /100 WBCs    RBC 4.54 4.50 - 5.90 x10*6/uL    Hemoglobin 13.3 (L) 13.5 - 17.5 g/dL    Hematocrit 40.4 (L) 41.0 - 52.0 %    MCV 89 80 - 100 fL    MCH 29.3 26.0 - 34.0 pg    MCHC 32.9 32.0 - 36.0 g/dL    RDW 13.9 11.5 - 14.5 %    Platelets 471 (H) 150 - 450 x10*3/uL    Neutrophils % 67.7 40.0 - 80.0 %    Immature Granulocytes %, Automated 0.3 0.0 - 0.9 %    Lymphocytes % 21.5 13.0 - 44.0 %    Monocytes % 8.4 2.0 - 10.0 %    Eosinophils % 1.6 0.0 - 6.0 %    Basophils % 0.5 0.0 - 2.0 %    Neutrophils Absolute 7.41 1.20 - 7.70 x10*3/uL    Immature Granulocytes Absolute, Automated 0.03 0.00 - 0.70 x10*3/uL    Lymphocytes Absolute 2.35 1.20 - 4.80 x10*3/uL    Monocytes Absolute 0.92 0.10 - 1.00 x10*3/uL    Eosinophils Absolute 0.17 0.00 - 0.70 x10*3/uL    Basophils Absolute 0.05 0.00 - 0.10 x10*3/uL   Comprehensive metabolic panel   Result Value Ref Range    Glucose 97 74 - 99 mg/dL    Sodium 135 (L) 136 - 145 mmol/L    Potassium 4.3 3.5 - 5.3 mmol/L    Chloride 98 98 - 107 mmol/L    Bicarbonate 26 21 - 32 mmol/L    Anion Gap 15 10 - 20 mmol/L    Urea Nitrogen 17 6 - 23 mg/dL    Creatinine 0.93  0.50 - 1.30 mg/dL    eGFR >90 >60 mL/min/1.73m*2    Calcium 10.1 8.6 - 10.3 mg/dL    Albumin 4.7 3.4 - 5.0 g/dL    Alkaline Phosphatase 86 33 - 136 U/L    Total Protein 8.2 6.4 - 8.2 g/dL    AST 18 9 - 39 U/L    Bilirubin, Total 0.4 0.0 - 1.2 mg/dL    ALT 27 10 - 52 U/L   Lactate   Result Value Ref Range    Lactate 1.8 0.4 - 2.0 mmol/L   Blood Culture    Specimen: Peripheral Venipuncture; Blood culture   Result Value Ref Range    Blood Culture Loaded on Instrument - Culture in progress    Blood Culture    Specimen: Peripheral Venipuncture; Blood culture   Result Value Ref Range    Blood Culture Loaded on Instrument - Culture in progress    Hemoglobin A1c   Result Value Ref Range    Hemoglobin A1C 6.6 (H) see below %    Estimated Average Glucose 143 Not Established mg/dL   CBC and Auto Differential   Result Value Ref Range    WBC 8.1 4.4 - 11.3 x10*3/uL    nRBC 0.0 0.0 - 0.0 /100 WBCs    RBC 4.34 (L) 4.50 - 5.90 x10*6/uL    Hemoglobin 12.9 (L) 13.5 - 17.5 g/dL    Hematocrit 38.0 (L) 41.0 - 52.0 %    MCV 88 80 - 100 fL    MCH 29.7 26.0 - 34.0 pg    MCHC 33.9 32.0 - 36.0 g/dL    RDW 13.8 11.5 - 14.5 %    Platelets 388 150 - 450 x10*3/uL    Neutrophils % 59.4 40.0 - 80.0 %    Immature Granulocytes %, Automated 0.4 0.0 - 0.9 %    Lymphocytes % 26.2 13.0 - 44.0 %    Monocytes % 10.4 2.0 - 10.0 %    Eosinophils % 3.1 0.0 - 6.0 %    Basophils % 0.5 0.0 - 2.0 %    Neutrophils Absolute 4.82 1.20 - 7.70 x10*3/uL    Immature Granulocytes Absolute, Automated 0.03 0.00 - 0.70 x10*3/uL    Lymphocytes Absolute 2.12 1.20 - 4.80 x10*3/uL    Monocytes Absolute 0.84 0.10 - 1.00 x10*3/uL    Eosinophils Absolute 0.25 0.00 - 0.70 x10*3/uL    Basophils Absolute 0.04 0.00 - 0.10 x10*3/uL   Basic Metabolic Panel   Result Value Ref Range    Glucose 105 (H) 74 - 99 mg/dL    Sodium 135 (L) 136 - 145 mmol/L    Potassium 4.0 3.5 - 5.3 mmol/L    Chloride 99 98 - 107 mmol/L    Bicarbonate 28 21 - 32 mmol/L    Anion Gap 12 10 - 20 mmol/L    Urea  Nitrogen 15 6 - 23 mg/dL    Creatinine 0.90 0.50 - 1.30 mg/dL    eGFR >90 >60 mL/min/1.73m*2    Calcium 9.6 8.6 - 10.3 mg/dL   POCT GLUCOSE   Result Value Ref Range    POCT Glucose 131 (H) 74 - 99 mg/dL       CT abdomen pelvis w IV contrast    Result Date: 6/9/2024  STUDY: CT Abdomen and Pelvis with IV Contrast; 06/09/2024, 5:47 PM. INDICATION: Follow up for abnormal US; soft tissue mass on right trunk. COMPARISON: US abdomen: 06/01/24. ACCESSION NUMBER(S): [id number] ORDERING CLINICIAN: MICHELLE ANTONY TECHNIQUE: CT of the abdomen and pelvis was performed.  Contiguous axial images were obtained at 3 mm slice thickness through the abdomen and pelvis. Coronal and sagittal reconstructions at 3 mm slice thickness were performed.  Omnipaque 350 80 mL was administered intravenously.  FINDINGS: LOWER CHEST: No cardiomegaly.  No pericardial effusion.  Lung bases are clear.  ABDOMEN:  LIVER: Hepatic steatosis.  BILE DUCTS: No intrahepatic or extrahepatic biliary ductal dilatation.  GALLBLADDER: The gallbladder is normal. STOMACH: No abnormalities identified.  PANCREAS: No masses or ductal dilatation.  SPLEEN: No splenomegaly or focal splenic lesion.  ADRENAL GLANDS: No thickening or nodules.  KIDNEYS AND URETERS: Kidneys are normal in size and location.  No renal or ureteral calculi.  PELVIS:  BLADDER: No abnormalities identified.  REPRODUCTIVE ORGANS: No abnormalities identified.  BOWEL: No abnormalities identified.  VESSELS: No abnormalities identified.  Abdominal aorta is normal in caliber.  PERITONEUM/RETROPERITONEUM/LYMPH NODES: No free fluid.  No pneumoperitoneum. No lymphadenopathy.  ABDOMINAL WALL: Within the right basal pleural space there is a small collection measuring 5.8 x 1.4 cm axial image 44. Stranding is noted along the inferior margin of the pleura extending into the lateral margin of the retroperitoneum with a tiny fluid collection in the right abdominal wall musculature measuring 2.3 cm axial image 83 and  a linear collection versus mass measuring 3.4 x 2.6 cm within the right lateral abdominal wall at the level of the inferior ribs axial image 71 probably visualized on prior ultrasound.. SOFT TISSUES: No abnormalities identified.  BONES: No acute fracture or aggressive osseous lesion.    Multiple small collections versus with likely mass lesions, at the right inferior margin of the pleural space extending into the right lateral peritoneal space and right upper quadrant abdominal wall. The second largest collection was probably previously visualized by ultrasound. Signed by John Chavez MD        Assessment/Plan     59 year old male with a history significant for HTN, HLD, NIDDM Type 2, prior appendectomy for perforated appendicitis (2013) c/b perihepatic abscess (2016- E.Coli), and urachal cyst remnant s/p open excision (2016) presented to [facility] ED from home on 6/10 with worsening redness and tenderness to right flank mass.      Impression:  Right flank mass (mixed solid/fluid components; unclear etiology)   Likely multi-loculated abscess related to previous perihepatic drain    Procedures:  6/10 - Bedside I&D right flank/chest wall abscess - Dr. Segovia     Recommendations:  - bedside I&D per Dr. Segovia today; culture sent. Follow up results   - if packing falls out, okay for nursing to replace, otherwise surgery will change dressing tomorrow. Reinforce PRN  - okay for diet  - defer to ID, but reasonable to continue Zosyn given history of Ecoli intra abdominal abscess. Patient has minimal risk factors for MRSA and do not feel like Vancomycin is needed at this time  - okay for DVT chemoprophylaxis   - symptom management/remainder of care per medical team    Dispo:  Admit to hospital under medicine service. Await culture results.   Outpatient follow up with Dr. Segovia in 1-2 weeks post discharge; anticipate repeat CT at that time     The patient was seen and discussed with the attending surgeon Dr. Segovia.     I  spent 25 minutes in the professional and overall care of this patient.  Greater than 50% of this time was spent counseling patient, reviewing plan of care, and in coordination of care.        Shaye Euceda, APRN-CNP

## 2024-06-10 NOTE — CARE PLAN
The patient's goals for the shift include  comfort    The clinical goals for the shift include  reduce risk of infection

## 2024-06-10 NOTE — PROGRESS NOTES
Saul Colon is a 59 y.o. male on day 1 of admission presenting with Chest wall abscess.      Subjective   Seen at bedside, feeling fine after abscess drainage.  Minimal local pain.  Dressings with small amount of blood.  Talked to surgery NP repeat imaging in 2 to 3 weeks as an outpatient.  Pending infectious disease consult.       Objective     Last Recorded Vitals  /84   Pulse 67   Temp 36.1 °C (97 °F)   Resp 20   Wt 101 kg (223 lb)   SpO2 92%   Intake/Output last 3 Shifts:    Intake/Output Summary (Last 24 hours) at 6/10/2024 1511  Last data filed at 6/10/2024 1122  Gross per 24 hour   Intake 50 ml   Output --   Net 50 ml       Admission Weight  Weight: 101 kg (223 lb) (06/09/24 1613)    Daily Weight  06/09/24 : 101 kg (223 lb)    Image Results  CT abdomen pelvis w IV contrast  Narrative: STUDY:  CT Abdomen and Pelvis with IV Contrast; 06/09/2024, 5:47 PM.  INDICATION:  Follow up for abnormal US; soft tissue mass on right trunk.  COMPARISON:  US abdomen: 06/01/24.  ACCESSION NUMBER(S):  [id number]  ORDERING CLINICIAN:  MICHELLE ANTONY  TECHNIQUE:  CT of the abdomen and pelvis was performed.  Contiguous axial images  were obtained at 3 mm slice thickness through the abdomen and pelvis.   Coronal and sagittal reconstructions at 3 mm slice thickness were  performed.  Omnipaque 350 80 mL was administered intravenously.    FINDINGS:  LOWER CHEST:  No cardiomegaly.  No pericardial effusion.  Lung bases are clear.     ABDOMEN:     LIVER:  Hepatic steatosis.     BILE DUCTS:  No intrahepatic or extrahepatic biliary ductal dilatation.     GALLBLADDER:  The gallbladder is normal.  STOMACH:  No abnormalities identified.     PANCREAS:  No masses or ductal dilatation.     SPLEEN:  No splenomegaly or focal splenic lesion.     ADRENAL GLANDS:  No thickening or nodules.     KIDNEYS AND URETERS:  Kidneys are normal in size and location.  No renal or ureteral  calculi.     PELVIS:     BLADDER:  No abnormalities  identified.     REPRODUCTIVE ORGANS:  No abnormalities identified.     BOWEL:  No abnormalities identified.     VESSELS:  No abnormalities identified.  Abdominal aorta is normal in caliber.      PERITONEUM/RETROPERITONEUM/LYMPH NODES:  No free fluid.  No pneumoperitoneum.  No lymphadenopathy.     ABDOMINAL WALL:  Within the right basal pleural space there is a small collection  measuring 5.8 x 1.4 cm axial image 44. Stranding is noted along the  inferior margin of the pleura extending into the lateral margin of the  retroperitoneum with a tiny fluid collection in the right abdominal  wall musculature measuring 2.3 cm axial image 83 and a linear  collection versus mass measuring 3.4 x 2.6 cm within the right lateral  abdominal wall at the level of the inferior ribs axial image 71  probably visualized on prior ultrasound..  SOFT TISSUES:   No abnormalities identified.     BONES:  No acute fracture or aggressive osseous lesion.  Impression: Multiple small collections versus with likely mass lesions, at the  right inferior margin of the pleural space extending into the right  lateral peritoneal space and right upper quadrant abdominal wall. The  second largest collection was probably previously visualized by  ultrasound.  Signed by John Chavez MD      Physical Exam  General: alert and oriented. No acute distress.  HEENT: oral mucosa moist, EOMI.  CHEST: clear breath sounds, no crackles, no wheeze, no tachypnea.  CVS: regular rate and rhythm, no murmurs.   ABD: Soft, Non Tender, BS present.  Rectus muscle diastasis.   EXT: no edema.  SKIN: no rash.  Right chest/flank-inferior wall 4 x 5 cm mass with fluid collection s/p I&D, dressing with minimal blood.  NEURO: Nonfocal grossly.      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  -Consulted infectious disease      Chronic conditions  Hypertension  Dyslipidemia  Diabetes type 2  GERD  -Continue home meds    Diet: regular  Lines/Devices: peripheral   Drips: -  ATBx: Zosyn and vancomycin day 2  PPX: Pantoprazole and enoxaparin  Code: Full code  Dispo: MedSurg  Discharge planning: Going home.    *This document was created using dragon dictation and may contain unintended error.           Gris Ibanez MD

## 2024-06-10 NOTE — ED PROVIDER NOTES
Limitations to History: None  External Records Reviewed  Independent Historians: None  Social determinants affecting care: None    HPI  Saul Colon is a 59 y.o. male with history of hypertension, hyperlipidemia, anxiety, GERD, who presents emergency department for assessment of a wound to the right flank region that started 1 week ago.  He reports that he noticed some swelling to the right flank.  He reports that he went to his PCP ordered an ultrasound.  The ultrasound showed a solid mass and recommended a CT.  He reports that he has a CT scheduled for next week however he noticed that the area was getting worse.  He reports that he developed redness and swelling that was new.  He has not had any fever or chills.  He denies any nausea or vomiting.  He reports that the area is uncomfortable but is not having any significant pain.  He reports history of liver abscess in which she had a percutaneous drain in 2016 that was in the similar location to where this wound is.  He has no further complaints.  His wife is at bedside.    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    Other conditions influencing health status 08/22/2016    Abscess or cellulitis of umbilicus    Other shoulder lesions, right shoulder 06/29/2016    Tendinitis of right rotator cuff    Pain in right knee 04/04/2022    Chronic pain of both knees    Personal history of diseases of the blood and blood-forming organs and certain disorders  involving the immune mechanism 04/17/2017    History of anemia    Personal history of diseases of the skin and subcutaneous tissue 08/16/2017    History of folliculitis    Personal history of other diseases of the digestive system     History of gastroesophageal reflux (GERD)    Personal history of other diseases of the musculoskeletal system and connective tissue 01/19/2015    History of arthritis    Personal history of other diseases of the respiratory system 02/15/2016    History of acute bronchitis    Plantar fascial fibromatosis 12/12/2014    Plantar fasciitis    Postnasal drip 08/12/2016    Post-nasal drip    Rash and other nonspecific skin eruption 11/08/2022    Rash    Umbilical hernia with obstruction, without gangrene 08/17/2016    Umbilical hernia, incarcerated    Unspecified otitis externa, unspecified ear 09/21/2018    Otitis externa    Unspecified rotator cuff tear or rupture of right shoulder, not specified as traumatic 04/17/2017    Rotator cuff syndrome of right shoulder    reviewed by myself.    Meds  Current Outpatient Medications   Medication Instructions    ascorbic acid (VITAMIN C) 500 mg, oral, Daily RT    atorvastatin (Lipitor) 80 mg tablet 1 tablet, oral, Daily    blood sugar diagnostic (FreeStyle Lite Strips) strip USE TO CHECK BLOOD SUGAR ONCE DAILY    lansoprazole (PREVACID) 30 mg, oral, Daily RT    lisinopril 20 mg tablet 1 tablet, oral, Daily    meloxicam (Mobic) 15 mg tablet 1 tablet, oral, Daily    metFORMIN (Glucophage) 500 mg tablet 1 tablet, oral, 2 times daily       Allergies  No Known Allergies reviewed by myself.    SHx  Social History     Tobacco Use    Smoking status: Never    Smokeless tobacco: Never   Vaping Use    Vaping status: Never Used   Substance Use Topics    Alcohol use: Yes     Alcohol/week: 2.0 standard drinks of alcohol     Types: 2 Cans of beer per week    Drug use: Never    reviewed by  myself.      ------------------------------------------------------------------------------------------------------------------------------------------    BP (!) 172/91 (BP Location: Left arm, Patient Position: Sitting)   Pulse 68   Temp 36.5 °C (97.7 °F) (Temporal)   Resp 20   Ht 1.829 m (6')   Wt 101 kg (223 lb)   SpO2 99%   BMI 30.24 kg/m²     Physical Exam  Vitals and nursing note reviewed.   Constitutional:       General: He is not in acute distress.     Appearance: Normal appearance. He is normal weight. He is not ill-appearing or toxic-appearing.   HENT:      Head: Normocephalic.      Nose: Nose normal.      Mouth/Throat:      Mouth: Mucous membranes are moist.   Eyes:      Extraocular Movements: Extraocular movements intact.      Conjunctiva/sclera: Conjunctivae normal.   Cardiovascular:      Rate and Rhythm: Normal rate and regular rhythm.   Pulmonary:      Effort: Pulmonary effort is normal.      Breath sounds: Normal breath sounds.   Abdominal:      General: Abdomen is flat. Bowel sounds are normal.      Palpations: Abdomen is soft.      Tenderness: There is no abdominal tenderness.   Musculoskeletal:         General: Normal range of motion.      Cervical back: Neck supple.      Comments: 5 x 4 cm erythematous fluctuant region to the right flank/right chest wall region with slight tenderness.  No active drainage.  No crepitus.   Skin:     General: Skin is warm and dry.   Neurological:      General: No focal deficit present.      Mental Status: He is alert and oriented to person, place, and time.   Psychiatric:         Attention and Perception: Attention normal.         Mood and Affect: Mood normal.          ------------------------------------------------------------------------------------------------------------------------------------------  Labs  Labs Reviewed   CBC WITH AUTO DIFFERENTIAL - Abnormal       Result Value    WBC 10.9      nRBC 0.0      RBC 4.54      Hemoglobin 13.3 (*)      Hematocrit 40.4 (*)     MCV 89      MCH 29.3      MCHC 32.9      RDW 13.9      Platelets 471 (*)     Neutrophils % 67.7      Immature Granulocytes %, Automated 0.3      Lymphocytes % 21.5      Monocytes % 8.4      Eosinophils % 1.6      Basophils % 0.5      Neutrophils Absolute 7.41      Immature Granulocytes Absolute, Automated 0.03      Lymphocytes Absolute 2.35      Monocytes Absolute 0.92      Eosinophils Absolute 0.17      Basophils Absolute 0.05     COMPREHENSIVE METABOLIC PANEL - Abnormal    Glucose 97      Sodium 135 (*)     Potassium 4.3      Chloride 98      Bicarbonate 26      Anion Gap 15      Urea Nitrogen 17      Creatinine 0.93      eGFR >90      Calcium 10.1      Albumin 4.7      Alkaline Phosphatase 86      Total Protein 8.2      AST 18      Bilirubin, Total 0.4      ALT 27     LACTATE - Normal    Lactate 1.8      Narrative:     Venipuncture immediately after or during the administration of Metamizole may lead to falsely low results. Testing should be performed immediately  prior to Metamizole dosing.   BLOOD CULTURE   BLOOD CULTURE   CBC WITH AUTO DIFFERENTIAL   BASIC METABOLIC PANEL   HEMOGLOBIN A1C        Imaging  CT abdomen pelvis w IV contrast   Final Result   Multiple small collections versus with likely mass lesions, at the   right inferior margin of the pleural space extending into the right   lateral peritoneal space and right upper quadrant abdominal wall. The   second largest collection was probably previously visualized by   ultrasound.   Signed by John Chavez MD           ED Course  ED Course as of 06/09/24 3449   Sun Jun 09, 2024 2129 In brief, this is a 59-year-old male who presents to the emergency department with right lateral rib redness and pain.  He has noticed that over the past week.  He did have an outpatient ultrasound which was nonconclusive.  Physical exam: Central fluctuant area on the lateral chest wall.  Surrounding erythema.  ED course: CT was obtained which does  show likely abscess with deep space involvement.  Patient has had a drainage tube there about 8 years ago after liver abscess.  I am not sure if this is related or created a tract.  Patient was discussed with surgery who will see the patient in consultation.  He is covered with antibiotics and will be admitted. [MK]      ED Course User Index  [MK] Milton Rodriges MD         Diagnoses as of 06/09/24 2257   Chest wall abscess        Medical Decision Making: He did not appear ill or toxic.  Vital signs reviewed from triage.  He was slightly tachycardic.  Otherwise hemodynamically stable.  Provider in triage started workup.  Once he arrived back to an evaluation room, I reassessed the patient.  His vital signs have improved.  Lab work was reviewed and essentially unremarkable.  No leukocytosis.  Normal lactic.  Blood cultures pending.  CT of the abdomen pelvis showing multiple small collections versus mass in the right inferior margin of the pleural space extending into the right lateral peritoneal space and right upper quadrant.  I consulted general surgery on-call.  I spoke with Dr. Segovia who recommends admission to medicine and he will consult IR for drainage/biopsy of the region as it is unclear if this is abscess or mass.  He recommends covering with IV antibiotics.  Patient ordered IV vancomycin IV Zosyn.  I consulted general medicine on-call.  ED attending spoke with Dr. Stovall who will admit.  Case discussed and evaluated with ED attending, Dr. Rodriges who is agreeable to patient plan of care.  Patient and wife updated about findings and admission and he is agreeable.  All of his questions and his wife's questions were addressed.    Diagnosis: Chest wall abscess  Plan: Admit           Harley Morales PA-C  06/09/24 0186

## 2024-06-10 NOTE — CONSULTS
Dietitian consult ordered per nursing screen or stage 3 or 4 pressure injury or multiple non healing wounds.  MST=0. Full assessment not warranted at this time as pt does not have pressure injuries or multiple non healing wounds.     Pt to be re-screened for LOS day 10 if still admitted. Please re-consult RD for changes in nutrition status or need for RD sooner than anticipated re-screening.

## 2024-06-10 NOTE — PROGRESS NOTES
Pharmacy Medication History Review    Saul Colon is a 59 y.o. male admitted for Chest wall abscess. Pharmacy reviewed the patient's [id number] medications and allergies for accuracy.    The list below reflectives the updated PTA list. Please review each medication in order reconciliation for additional clarification and justification.  Prior to Admission medications    Medication Sig Start Date End Date Taking? Authorizing Provider   ascorbic acid (Vitamin C) 500 mg tablet Take 1 tablet (500 mg) by mouth once daily.   Yes Historical Provider, MD   atorvastatin (Lipitor) 80 mg tablet Take 1 tablet (80 mg) by mouth once daily. 6/24/15  Yes Historical Provider, MD   blood sugar diagnostic (FreeStyle Lite Strips) strip USE TO CHECK BLOOD SUGAR ONCE DAILY 1/8/24  Yes Josse Al MD   lansoprazole (Prevacid) 30 mg DR capsule Take 1 capsule (30 mg) by mouth once daily.   Yes Historical Provider, MD   lisinopril 20 mg tablet Take 1 tablet (20 mg) by mouth once daily. 11/23/16  Yes Historical Provider, MD   metFORMIN (Glucophage) 500 mg tablet Take 1 tablet (500 mg) by mouth 2 times a day. 1/11/18  Yes Historical Provider, MD   meloxicam (Mobic) 15 mg tablet Take 1 tablet (15 mg) by mouth once daily. 4/4/22  no Historical Provider, MD        The list below reflectives the updated allergy list. Please review each documented allergy for additional clarification and justification.  Allergies  Reviewed by Aranza Vicente, EMT on 6/9/2024   No Known Allergies         Below are additional concerns with the patient's PTA list.      Marcy Ontiveros

## 2024-06-10 NOTE — CONSULTS
Vancomycin Dosing by Pharmacy- EMERGENCY DEPARTMENT    Saul Colon is a 59 y.o. year old male who Pharmacy has been consulted to give a ONE TIME ONLY vancomycin dose in the Emergency Department for cellulitis, skin and soft tissue.     Visit Vitals  BP (!) 172/91 (BP Location: Left arm, Patient Position: Sitting)   Pulse 68   Temp 36.5 °C (97.7 °F) (Temporal)   Resp 20      Lab Results   Component Value Date    CREATININE 0.93 06/09/2024    CREATININE 0.80 06/06/2024    CREATININE 0.77 07/12/2023    CREATININE 0.83 03/30/2022    CREATININE 1.01 04/22/2021    CREATININE 0.96 06/19/2020      Wt Readings from Last 1 Encounters:   06/09/24 101 kg (223 lb)        Assessment/Plan     Patient will be given a one time dose of 1,500 mg  Pharmacy will sign off at this time. If vancomycin is to be continued, please re-consult Pharmacy.       Mark Disla, PharmD

## 2024-06-10 NOTE — CONSULTS
Vancomycin Dosing by Pharmacy- INITIAL    Saul Colon is a 59 y.o. year old male who Pharmacy has been consulted for vancomycin dosing for abdominal infection. Based on the patient's indication and renal status this patient will be dosed based on a goal AUC of 400-600.     Renal function is currently stable.    Visit Vitals  BP (!) 172/91 (BP Location: Left arm, Patient Position: Sitting)   Pulse 68   Temp 36.5 °C (97.7 °F) (Temporal)   Resp 20        Lab Results   Component Value Date    CREATININE 0.93 2024    CREATININE 0.80 2024    CREATININE 0.77 2023    CREATININE 0.83 2022    CREATININE 1.01 2021    CREATININE 0.96 2020        Patient weight is as follows:   Vitals:    24 1613   Weight: 101 kg (223 lb)       Cultures:  No results found for the encounter in last 14 days.        No intake/output data recorded.  I/O during current shift:  No intake/output data recorded.    Temp (24hrs), Av.5 °C (97.7 °F), Min:36.5 °C (97.7 °F), Max:36.5 °C (97.7 °F)         Assessment/Plan     Patient has already been given a loading dose of 1500 mg.  Will initiate vancomycin maintenance,  1250 mg every 12 hours.    This dosing regimen is predicted by [employer] to result in the following pharmacokinetic parameters:  Regimen: 1250 mg IV every 12 hours.  Start time: 12:19 on 06/10/2024  Exposure target: AUC24 (range)400-600 mg/L.hr   AUC24,ss: 547 mg/L.hr  Probability of AUC24 > 400: 81 %  Ctrough,ss: 17.6 mg/L  Probability of Ctrough,ss > 20: 39 %  Probability of nephrotoxicity (Lodise KIM ): 13 %  Follow-up level will be ordered on  at AM labs unless clinically indicated sooner.  Will continue to monitor renal function daily while on vancomycin and order serum creatinine at least every 48 hours if not already ordered.  Follow for continued vancomycin needs, clinical response, and signs/symptoms of toxicity.       Paul Delgado RPh

## 2024-06-10 NOTE — H&P
History Of Present Illness  58 yo M with PMHx of HTN, HLD, DM II, and GERD presents with an enlarging R flank mass. He first noticed what appeared to be a small boil approximately one week ago. Over the past week it has grown in size, become tender, and erythematous. His PCP ordered an outpatient US which showed a 1.6 x 3.5 x 3.5cm solid hypoechoic soft tissue mass with recommendation for a follow up contrast enhanced CT. He was scheduled for an outpatient CT but due to rapid growth and tenderness he came to the ED. He denies fever, chills, and drainage from the mass. The ED discussed with general sx Dr. Segovia on call who reviewed CT images and recommended covering with broad spectrum IV abx and will discuss with IR for drainage/bx. He does have a history of perforated appendix complicated by perihepatic abscess (Ecoli and anaerobes) in 2016 that required SOCORRO drain placement and long term abx.     Surgical History  Colonoscopy, appendectomy, hernia repair, vasectomy     Social History  Denies tobacco, EtOH, and illicit    Family History  HTN, HLD    Allergies  Patient has no known allergies.    Review of Systems   Skin:         R flank mass     Physical Exam  G: aox3, NAD, cooperative  HENT: neck supple, no JVD  Eyes: clear sclera  CV: RRR s1 s2  L: clear  Abd: soft, non distended, L solid erythematous tender flank mass, (area outlined)  Ext: no c/c/e  N: no appreciable acute focal deficits  Psych: appropriate mood and behavior    Last Recorded Vitals  BP (!) 172/91 (BP Location: Left arm, Patient Position: Sitting)   Pulse 68   Temp 36.5 °C (97.7 °F) (Temporal)   Resp 20   Wt 101 kg (223 lb)   SpO2 99%     Assessment/Plan     R flank mass, possible abscess    HTN, HLD  DM II  GERD  h/o perforated appendix c/b perihepatic abscess (Ecoli and anaerobes) 2016  DVT ppx    Plan:  - Broad spectrum IV abx, ED discussed with general sx Dr. Segovia on call who reviewed CT images and recommended covering with broad  spectrum IV abx for now and will discuss with IR for drainage/bx. Will also consult ID.   - Continue home meds but hold metformin    Rolo Stovall, DO

## 2024-06-11 LAB
ANION GAP SERPL CALC-SCNC: 15 MMOL/L (ref 10–20)
BASOPHILS # BLD AUTO: 0.04 X10*3/UL (ref 0–0.1)
BASOPHILS NFR BLD AUTO: 0.5 %
BUN SERPL-MCNC: 15 MG/DL (ref 6–23)
CALCIUM SERPL-MCNC: 9.7 MG/DL (ref 8.6–10.3)
CHLORIDE SERPL-SCNC: 100 MMOL/L (ref 98–107)
CO2 SERPL-SCNC: 27 MMOL/L (ref 21–32)
CREAT SERPL-MCNC: 0.97 MG/DL (ref 0.5–1.3)
EGFRCR SERPLBLD CKD-EPI 2021: 89 ML/MIN/1.73M*2
EOSINOPHIL # BLD AUTO: 0.21 X10*3/UL (ref 0–0.7)
EOSINOPHIL NFR BLD AUTO: 2.5 %
ERYTHROCYTE [DISTWIDTH] IN BLOOD BY AUTOMATED COUNT: 13.8 % (ref 11.5–14.5)
GLUCOSE BLD MANUAL STRIP-MCNC: 136 MG/DL (ref 74–99)
GLUCOSE BLD MANUAL STRIP-MCNC: 139 MG/DL (ref 74–99)
GLUCOSE BLD MANUAL STRIP-MCNC: 178 MG/DL (ref 74–99)
GLUCOSE BLD MANUAL STRIP-MCNC: 97 MG/DL (ref 74–99)
GLUCOSE SERPL-MCNC: 112 MG/DL (ref 74–99)
HCT VFR BLD AUTO: 42 % (ref 41–52)
HGB BLD-MCNC: 13.5 G/DL (ref 13.5–17.5)
IMM GRANULOCYTES # BLD AUTO: 0.04 X10*3/UL (ref 0–0.7)
IMM GRANULOCYTES NFR BLD AUTO: 0.5 % (ref 0–0.9)
LYMPHOCYTES # BLD AUTO: 1.86 X10*3/UL (ref 1.2–4.8)
LYMPHOCYTES NFR BLD AUTO: 22 %
MCH RBC QN AUTO: 28.7 PG (ref 26–34)
MCHC RBC AUTO-ENTMCNC: 32.1 G/DL (ref 32–36)
MCV RBC AUTO: 89 FL (ref 80–100)
MONOCYTES # BLD AUTO: 0.77 X10*3/UL (ref 0.1–1)
MONOCYTES NFR BLD AUTO: 9.1 %
NEUTROPHILS # BLD AUTO: 5.52 X10*3/UL (ref 1.2–7.7)
NEUTROPHILS NFR BLD AUTO: 65.4 %
NRBC BLD-RTO: 0 /100 WBCS (ref 0–0)
PLATELET # BLD AUTO: 377 X10*3/UL (ref 150–450)
POTASSIUM SERPL-SCNC: 4.6 MMOL/L (ref 3.5–5.3)
RBC # BLD AUTO: 4.71 X10*6/UL (ref 4.5–5.9)
SODIUM SERPL-SCNC: 137 MMOL/L (ref 136–145)
VANCOMYCIN SERPL-MCNC: 7 UG/ML (ref 5–20)
WBC # BLD AUTO: 8.4 X10*3/UL (ref 4.4–11.3)

## 2024-06-11 PROCEDURE — 82947 ASSAY GLUCOSE BLOOD QUANT: CPT

## 2024-06-11 PROCEDURE — 80202 ASSAY OF VANCOMYCIN: CPT | Performed by: STUDENT IN AN ORGANIZED HEALTH CARE EDUCATION/TRAINING PROGRAM

## 2024-06-11 PROCEDURE — 99232 SBSQ HOSP IP/OBS MODERATE 35: CPT | Performed by: STUDENT IN AN ORGANIZED HEALTH CARE EDUCATION/TRAINING PROGRAM

## 2024-06-11 PROCEDURE — 2500000004 HC RX 250 GENERAL PHARMACY W/ HCPCS (ALT 636 FOR OP/ED): Mod: JZ | Performed by: STUDENT IN AN ORGANIZED HEALTH CARE EDUCATION/TRAINING PROGRAM

## 2024-06-11 PROCEDURE — 80048 BASIC METABOLIC PNL TOTAL CA: CPT | Performed by: STUDENT IN AN ORGANIZED HEALTH CARE EDUCATION/TRAINING PROGRAM

## 2024-06-11 PROCEDURE — 2500000001 HC RX 250 WO HCPCS SELF ADMINISTERED DRUGS (ALT 637 FOR MEDICARE OP): Performed by: STUDENT IN AN ORGANIZED HEALTH CARE EDUCATION/TRAINING PROGRAM

## 2024-06-11 PROCEDURE — 1200000002 HC GENERAL ROOM WITH TELEMETRY DAILY

## 2024-06-11 PROCEDURE — 2500000004 HC RX 250 GENERAL PHARMACY W/ HCPCS (ALT 636 FOR OP/ED): Mod: JZ | Performed by: INTERNAL MEDICINE

## 2024-06-11 PROCEDURE — 85025 COMPLETE CBC W/AUTO DIFF WBC: CPT | Performed by: STUDENT IN AN ORGANIZED HEALTH CARE EDUCATION/TRAINING PROGRAM

## 2024-06-11 PROCEDURE — 36415 COLL VENOUS BLD VENIPUNCTURE: CPT | Performed by: STUDENT IN AN ORGANIZED HEALTH CARE EDUCATION/TRAINING PROGRAM

## 2024-06-11 RX ADMIN — ATORVASTATIN CALCIUM 80 MG: 80 TABLET, FILM COATED ORAL at 08:31

## 2024-06-11 RX ADMIN — PIPERACILLIN SODIUM AND TAZOBACTAM SODIUM 3.38 G: 3; .375 INJECTION, SOLUTION INTRAVENOUS at 18:05

## 2024-06-11 RX ADMIN — VANCOMYCIN HYDROCHLORIDE 1500 MG: 1.5 INJECTION, POWDER, LYOPHILIZED, FOR SOLUTION INTRAVENOUS at 18:07

## 2024-06-11 RX ADMIN — PIPERACILLIN SODIUM AND TAZOBACTAM SODIUM 3.38 G: 3; .375 INJECTION, SOLUTION INTRAVENOUS at 12:08

## 2024-06-11 RX ADMIN — VANCOMYCIN HYDROCHLORIDE 1250 MG: 1.25 INJECTION, POWDER, LYOPHILIZED, FOR SOLUTION INTRAVENOUS at 05:55

## 2024-06-11 RX ADMIN — LISINOPRIL 20 MG: 20 TABLET ORAL at 08:31

## 2024-06-11 RX ADMIN — PIPERACILLIN SODIUM AND TAZOBACTAM SODIUM 3.38 G: 3; .375 INJECTION, SOLUTION INTRAVENOUS at 05:12

## 2024-06-11 RX ADMIN — ENOXAPARIN SODIUM 40 MG: 40 INJECTION SUBCUTANEOUS at 23:03

## 2024-06-11 ASSESSMENT — PAIN SCALES - GENERAL
PAINLEVEL_OUTOF10: 0 - NO PAIN
PAINLEVEL_OUTOF10: 0 - NO PAIN

## 2024-06-11 ASSESSMENT — COGNITIVE AND FUNCTIONAL STATUS - GENERAL
DAILY ACTIVITIY SCORE: 24
MOBILITY SCORE: 24

## 2024-06-11 ASSESSMENT — PAIN - FUNCTIONAL ASSESSMENT: PAIN_FUNCTIONAL_ASSESSMENT: 0-10

## 2024-06-11 NOTE — PROGRESS NOTES
Saul Colon is a 59 y.o. male on day 2 of admission presenting with Chest wall abscess.      Subjective   Seen at bedside.  Feeling fine.  No overnight events.  Minimal local pain when ambulating or using the restroom.  Per ID consult: team will de-escalate antibiotics when cultures are back.       Objective     Last Recorded Vitals  /85   Pulse 78   Temp 35.6 °C (96.1 °F)   Resp 19   Wt 101 kg (223 lb)   SpO2 96%   Intake/Output last 3 Shifts:    Intake/Output Summary (Last 24 hours) at 6/11/2024 0907  Last data filed at 6/10/2024 2100  Gross per 24 hour   Intake 350 ml   Output --   Net 350 ml       Admission Weight  Weight: 101 kg (223 lb) (06/09/24 1613)    Daily Weight  06/09/24 : 101 kg (223 lb)    Image Results  CT abdomen pelvis w IV contrast  Narrative: STUDY:  CT Abdomen and Pelvis with IV Contrast; 06/09/2024, 5:47 PM.  INDICATION:  Follow up for abnormal US; soft tissue mass on right trunk.  COMPARISON:  US abdomen: 06/01/24.  ACCESSION NUMBER(S):  [id number]  ORDERING CLINICIAN:  MICHELLE ANTONY  TECHNIQUE:  CT of the abdomen and pelvis was performed.  Contiguous axial images  were obtained at 3 mm slice thickness through the abdomen and pelvis.   Coronal and sagittal reconstructions at 3 mm slice thickness were  performed.  Omnipaque 350 80 mL was administered intravenously.    FINDINGS:  LOWER CHEST:  No cardiomegaly.  No pericardial effusion.  Lung bases are clear.     ABDOMEN:     LIVER:  Hepatic steatosis.     BILE DUCTS:  No intrahepatic or extrahepatic biliary ductal dilatation.     GALLBLADDER:  The gallbladder is normal.  STOMACH:  No abnormalities identified.     PANCREAS:  No masses or ductal dilatation.     SPLEEN:  No splenomegaly or focal splenic lesion.     ADRENAL GLANDS:  No thickening or nodules.     KIDNEYS AND URETERS:  Kidneys are normal in size and location.  No renal or ureteral  calculi.     PELVIS:     BLADDER:  No abnormalities identified.     REPRODUCTIVE  ORGANS:  No abnormalities identified.     BOWEL:  No abnormalities identified.     VESSELS:  No abnormalities identified.  Abdominal aorta is normal in caliber.      PERITONEUM/RETROPERITONEUM/LYMPH NODES:  No free fluid.  No pneumoperitoneum.  No lymphadenopathy.     ABDOMINAL WALL:  Within the right basal pleural space there is a small collection  measuring 5.8 x 1.4 cm axial image 44. Stranding is noted along the  inferior margin of the pleura extending into the lateral margin of the  retroperitoneum with a tiny fluid collection in the right abdominal  wall musculature measuring 2.3 cm axial image 83 and a linear  collection versus mass measuring 3.4 x 2.6 cm within the right lateral  abdominal wall at the level of the inferior ribs axial image 71  probably visualized on prior ultrasound..  SOFT TISSUES:   No abnormalities identified.     BONES:  No acute fracture or aggressive osseous lesion.  Impression: Multiple small collections versus with likely mass lesions, at the  right inferior margin of the pleural space extending into the right  lateral peritoneal space and right upper quadrant abdominal wall. The  second largest collection was probably previously visualized by  ultrasound.  Signed by John Chavez MD      Physical Exam  General: alert and oriented. No acute distress.  HEENT: oral mucosa moist, EOMI.  CHEST: clear breath sounds, no crackles, no wheeze, no tachypnea.  CVS: regular rate and rhythm, no murmurs.   ABD: Soft, Non Tender, BS present.  Rectus muscle diastasis.   EXT: no edema.  SKIN: no rash.  Right chest/flank-inferior wall  with dressing s/p I&D.   NEURO: Nonfocal grossly.      Assessment/Plan        Right chest/flank wall abscess s/p drainage on 6/10  -h/o perforated appendix c/b perihepatic abscess (Ecoli and anaerobes) 2016   -Sent sample for microbiology, will follow cultures  -Continue Zosyn and vancomycin  -Seen by general surgery, needs follow-up as an outpatient in 2 weeks to  repeat imaging and decide if further evaluation is needed.  -Seen by infectious disease, cultures pending for further recommendations.        Chronic conditions  Hypertension  Dyslipidemia  Diabetes type 2  GERD  -Continue home meds    Diet: regular  Lines/Devices: peripheral   Drips: -  ATBx: Zosyn and vancomycin day 3  PPX: Pantoprazole and enoxaparin  Code: Full code  Dispo: MedSurg  Discharge planning: Going home.    *This document was created using dragon dictation and may contain unintended error.           Gris Ibanez MD

## 2024-06-11 NOTE — CONSULTS
Consults  Referring physician Dr. Margarito Stovall  History of present illness    This is a 59-year-old pleasant male came in with a right flank mass.  He had an abscess which was I&D.  We were called for further antibiotic management    Past medical history significant for hypertension, hyperlipidemia, diabetes mellitus, GERD, appendicitis that had ruptured    Past surgical history colonoscopy, appendectomy, hernia repair, vasectomy    Social history no drinking smoking drug use  Family history no sick contacts  Allergies no known antibiotic allergies  Social history no drinking smoking drug use Works as a manager at [employer]  A 10 point review system was obtained with the patient denying any complaints outside of those listed in HPI above    Physical exam  HEENT PERRLA  Chest fair air entry bilaterally  CVS S1-S2 regular  Abdomen soft nontender plus bowel sounds  Extremities possibles bilaterally no pitting edema  Skin his right flank area has a wound that was I&D Some serosanguineous drainage noted    Labs and radiology reviewed    Impression:  59-year-old male with a right flank abscess that was I&D at this time will recommend the following    Suggestion:  1.  Continue current antibiotics  2.  We will follow-up the cultures and de-escalate antibiotics when these are back  3.  Please see dressing orders  Thank for this consult follow with you as needed    I have reviewed and interpreted all lab test imaging studies and documentations from other healthcare providers  I am monitoring antibiotics for side effects, toxicity and vancomycin levels thank you end of dictation

## 2024-06-11 NOTE — CARE PLAN
The patient's goals for the shift include      The clinical goals for the shift include pain control

## 2024-06-11 NOTE — PROGRESS NOTES
06/11/24 1130   Discharge Planning   Living Arrangements Spouse/significant other   Support Systems Spouse/significant other   Type of Residence Private residence   Do you have animals or pets at home? No   Who is requesting discharge planning? Provider   Patient expects to be discharged to: home   Financial Resource Strain   How hard is it for you to pay for the very basics like food, housing, medical care, and heating? Not very     Met with pt , pt admit for abscess.  Pt is independent, verified home address and insurance.  DC plan is home, may need long term IV ABX, if so , pt's PCP is  Dr. Al and pt's [facility] for [facility] is  [facility].  Await cultures and ID's recommendations.   Jo Ann Ling RN TCC

## 2024-06-11 NOTE — PROGRESS NOTES
Vancomycin Dosing by Pharmacy- FOLLOW UP    Saul Colon is a 59 y.o. year old male who Pharmacy has been consulted for vancomycin dosing for intra-abdominal infection. Based on the patient's indication and renal status this patient is being dosed based on a goal AUC of 400-600.     Renal function is currently stable.    Current vancomycin dose: 1250 mg given every 12 hours    Estimated vancomycin AUC on current dose: 418 mg/L.hr     Visit Vitals  /76   Pulse 57   Temp 36.4 °C (97.5 °F) (Temporal)   Resp 14        Lab Results   Component Value Date    CREATININE 0.97 06/11/2024    CREATININE 0.90 06/10/2024    CREATININE 0.93 06/09/2024    CREATININE 0.80 06/06/2024      No results found for the encounter in last 14 days.    I/O last 3 completed shifts:  In: 350 (3.5 mL/kg) [IV Piggyback:350]  Out: - (0 mL/kg)   Weight: 101.2 kg       Assessment/Plan    Day #2 of vancomycin therapy.   Predicted AUC is within goal range, but only has a 60% chance of AUC being >400 mg/L*h. Will adjust regimen to 1500 mg IV q12h to achieve 89% chance while still maintaining a low risk of toxicity.     This dosing regimen is predicted by [employer] to result in the following pharmacokinetic parameters:  AUC24,ss: 501 mg/L.hr  Probability of AUC24 > 400: 89 %  Ctrough,ss: 14.4 mg/L  Probability of Ctrough,ss > 20: 17 %  Probability of nephrotoxicity (Lodise KIM 2009): 10 %    The next level will be obtained on 6/13 with Mid[facility] labs. May be obtained sooner if clinically indicated.   Will continue to monitor renal function daily while on vancomycin and order serum creatinine at least every 48 hours if not already ordered.  Will follow for continued vancomycin needs, clinical response, and signs/symptoms of toxicity.     Please call with any questions.  Krystina Aponte, PharmD, BCCCP  [id number]

## 2024-06-11 NOTE — PROGRESS NOTES
Saul Colon is a 59 y.o. male on day 2 of admission presenting with Chest wall abscess.    Subjective    Patient denies fever or chills. States he banged his right side/wound getting back into bed last night causing a lot of pain, but otherwise denies pain at rest.    Induration and erythema surrounding abscess improved from yesterday. No growth on preliminary gram stain. Still without leukocytosis        Objective     Physical Exam  Constitutional:       General: He is not in acute distress.     Appearance: He is normal weight. He is not ill-appearing or toxic-appearing.   HENT:      Head: Normocephalic.      Nose: Nose normal.      Mouth/Throat:      Mouth: Mucous membranes are moist.   Eyes:      General: No scleral icterus.  Cardiovascular:      Pulses: Normal pulses.   Pulmonary:      Effort: Pulmonary effort is normal. No respiratory distress.   Abdominal:      General: There is no distension.      Palpations: Abdomen is soft.      Tenderness: There is no abdominal tenderness.   Skin:     General: Skin is warm.      Comments: Right flank abscess s/p I&D - 2cm area of opening- quite deep but not palpated today. Dressing changed by Dr. Segovia- old dressing and wound still with purulent bloody drainage.  Surrounding erythema improving, but still present. Quite tender    Neurological:      Mental Status: He is alert and oriented to person, place, and time.   Psychiatric:         Mood and Affect: Mood normal.         Behavior: Behavior normal.         Last Recorded Vitals  Blood pressure 130/85, pulse 78, temperature 35.6 °C (96.1 °F), temperature source Temporal, resp. rate 19, height 1.829 m (6'), weight 101 kg (223 lb), SpO2 96%.  Intake/Output last 3 Shifts:  I/O last 3 completed shifts:  In: 350 (3.5 mL/kg) [IV Piggyback:350]  Out: - (0 mL/kg)   Weight: 101.2 kg     Relevant Results  Results for orders placed or performed during the hospital encounter of 06/09/24 (from the past 24 hour(s))   POCT GLUCOSE    Result Value Ref Range    POCT Glucose 104 (H) 74 - 99 mg/dL   POCT GLUCOSE   Result Value Ref Range    POCT Glucose 139 (H) 74 - 99 mg/dL   CBC and Auto Differential   Result Value Ref Range    WBC 8.4 4.4 - 11.3 x10*3/uL    nRBC 0.0 0.0 - 0.0 /100 WBCs    RBC 4.71 4.50 - 5.90 x10*6/uL    Hemoglobin 13.5 13.5 - 17.5 g/dL    Hematocrit 42.0 41.0 - 52.0 %    MCV 89 80 - 100 fL    MCH 28.7 26.0 - 34.0 pg    MCHC 32.1 32.0 - 36.0 g/dL    RDW 13.8 11.5 - 14.5 %    Platelets 377 150 - 450 x10*3/uL    Neutrophils % 65.4 40.0 - 80.0 %    Immature Granulocytes %, Automated 0.5 0.0 - 0.9 %    Lymphocytes % 22.0 13.0 - 44.0 %    Monocytes % 9.1 2.0 - 10.0 %    Eosinophils % 2.5 0.0 - 6.0 %    Basophils % 0.5 0.0 - 2.0 %    Neutrophils Absolute 5.52 1.20 - 7.70 x10*3/uL    Immature Granulocytes Absolute, Automated 0.04 0.00 - 0.70 x10*3/uL    Lymphocytes Absolute 1.86 1.20 - 4.80 x10*3/uL    Monocytes Absolute 0.77 0.10 - 1.00 x10*3/uL    Eosinophils Absolute 0.21 0.00 - 0.70 x10*3/uL    Basophils Absolute 0.04 0.00 - 0.10 x10*3/uL   Vancomycin   Result Value Ref Range    Vancomycin 7.0 5.0 - 20.0 ug/mL   Basic Metabolic Panel   Result Value Ref Range    Glucose 112 (H) 74 - 99 mg/dL    Sodium 137 136 - 145 mmol/L    Potassium 4.6 3.5 - 5.3 mmol/L    Chloride 100 98 - 107 mmol/L    Bicarbonate 27 21 - 32 mmol/L    Anion Gap 15 10 - 20 mmol/L    Urea Nitrogen 15 6 - 23 mg/dL    Creatinine 0.97 0.50 - 1.30 mg/dL    eGFR 89 >60 mL/min/1.73m*2    Calcium 9.7 8.6 - 10.3 mg/dL   POCT GLUCOSE   Result Value Ref Range    POCT Glucose 136 (H) 74 - 99 mg/dL   POCT GLUCOSE   Result Value Ref Range    POCT Glucose 97 74 - 99 mg/dL                Assessment/Plan   Principal Problem:    Chest wall abscess    59 year old male with a history significant for HTN, HLD, NIDDM Type 2, prior appendectomy for perforated appendicitis (2013) c/b perihepatic abscess (2016- E.Coli), and urachal cyst remnant s/p open excision (2016) presented to   "[place] ED from home on 6/10 with worsening redness and tenderness to right flank mass.       Impression:  Right flank abscess    Likely multi-loculated abscess related to previous perihepatic drain     Procedures:  6/10 - Bedside I&D right flank/chest wall abscess - Dr. Segovia      Recommendations:  - follow up culture results   - surgery to change dressing tomorrow; okay for nursing to reinforce. Packing with 1/2\" Gauze  - okay for diet  - defer to ID for antibiotic management, but ideally can de-escalate by tomorrow  - okay for DVT chemoprophylaxis   - symptom management/remainder of care per medical team     Dispo:  Admit to hospital under medicine service. Await culture results.   Outpatient follow up with Dr. Segovia in 1-2 weeks post discharge; anticipate repeat CT at that time      The patient was seen and discussed with the attending surgeon Dr. Segovia.        I spent 15 minutes in the professional and overall care of this patient.      Shaye Euceda, APRN-CNP      "

## 2024-06-12 ENCOUNTER — APPOINTMENT (OUTPATIENT)
Dept: RADIOLOGY | Facility: EXTERNAL LOCATION | Age: 59
End: 2024-06-12
Payer: COMMERCIAL

## 2024-06-12 LAB
ERYTHROCYTE [DISTWIDTH] IN BLOOD BY AUTOMATED COUNT: 13.9 % (ref 11.5–14.5)
GLUCOSE BLD MANUAL STRIP-MCNC: 118 MG/DL (ref 74–99)
GLUCOSE BLD MANUAL STRIP-MCNC: 143 MG/DL (ref 74–99)
GLUCOSE BLD MANUAL STRIP-MCNC: 200 MG/DL (ref 74–99)
GLUCOSE BLD MANUAL STRIP-MCNC: 97 MG/DL (ref 74–99)
HCT VFR BLD AUTO: 43.6 % (ref 41–52)
HGB BLD-MCNC: 14.2 G/DL (ref 13.5–17.5)
HOLD SPECIMEN: NORMAL
MCH RBC QN AUTO: 29.2 PG (ref 26–34)
MCHC RBC AUTO-ENTMCNC: 32.6 G/DL (ref 32–36)
MCV RBC AUTO: 90 FL (ref 80–100)
NRBC BLD-RTO: 0 /100 WBCS (ref 0–0)
PLATELET # BLD AUTO: 407 X10*3/UL (ref 150–450)
RBC # BLD AUTO: 4.86 X10*6/UL (ref 4.5–5.9)
WBC # BLD AUTO: 8.6 X10*3/UL (ref 4.4–11.3)

## 2024-06-12 PROCEDURE — 82947 ASSAY GLUCOSE BLOOD QUANT: CPT

## 2024-06-12 PROCEDURE — 99232 SBSQ HOSP IP/OBS MODERATE 35: CPT | Performed by: STUDENT IN AN ORGANIZED HEALTH CARE EDUCATION/TRAINING PROGRAM

## 2024-06-12 PROCEDURE — 99024 POSTOP FOLLOW-UP VISIT: CPT | Performed by: SURGERY

## 2024-06-12 PROCEDURE — 2500000004 HC RX 250 GENERAL PHARMACY W/ HCPCS (ALT 636 FOR OP/ED): Performed by: STUDENT IN AN ORGANIZED HEALTH CARE EDUCATION/TRAINING PROGRAM

## 2024-06-12 PROCEDURE — 85027 COMPLETE CBC AUTOMATED: CPT | Performed by: INTERNAL MEDICINE

## 2024-06-12 PROCEDURE — 2500000001 HC RX 250 WO HCPCS SELF ADMINISTERED DRUGS (ALT 637 FOR MEDICARE OP): Performed by: STUDENT IN AN ORGANIZED HEALTH CARE EDUCATION/TRAINING PROGRAM

## 2024-06-12 PROCEDURE — 1200000002 HC GENERAL ROOM WITH TELEMETRY DAILY

## 2024-06-12 PROCEDURE — 2500000004 HC RX 250 GENERAL PHARMACY W/ HCPCS (ALT 636 FOR OP/ED): Performed by: INTERNAL MEDICINE

## 2024-06-12 PROCEDURE — 36415 COLL VENOUS BLD VENIPUNCTURE: CPT | Performed by: INTERNAL MEDICINE

## 2024-06-12 PROCEDURE — 2500000002 HC RX 250 W HCPCS SELF ADMINISTERED DRUGS (ALT 637 FOR MEDICARE OP, ALT 636 FOR OP/ED): Performed by: STUDENT IN AN ORGANIZED HEALTH CARE EDUCATION/TRAINING PROGRAM

## 2024-06-12 RX ADMIN — ENOXAPARIN SODIUM 40 MG: 40 INJECTION SUBCUTANEOUS at 23:14

## 2024-06-12 RX ADMIN — IBUPROFEN 400 MG: 400 TABLET ORAL at 11:18

## 2024-06-12 RX ADMIN — VANCOMYCIN HYDROCHLORIDE 1500 MG: 1.5 INJECTION, POWDER, LYOPHILIZED, FOR SOLUTION INTRAVENOUS at 18:09

## 2024-06-12 RX ADMIN — PIPERACILLIN SODIUM AND TAZOBACTAM SODIUM 3.38 G: 3; .375 INJECTION, SOLUTION INTRAVENOUS at 00:10

## 2024-06-12 RX ADMIN — ATORVASTATIN CALCIUM 80 MG: 80 TABLET, FILM COATED ORAL at 08:53

## 2024-06-12 RX ADMIN — PIPERACILLIN SODIUM AND TAZOBACTAM SODIUM 3.38 G: 3; .375 INJECTION, SOLUTION INTRAVENOUS at 12:49

## 2024-06-12 RX ADMIN — PIPERACILLIN SODIUM AND TAZOBACTAM SODIUM 3.38 G: 3; .375 INJECTION, SOLUTION INTRAVENOUS at 18:09

## 2024-06-12 RX ADMIN — PANTOPRAZOLE SODIUM 20 MG: 20 TABLET, DELAYED RELEASE ORAL at 08:53

## 2024-06-12 RX ADMIN — LISINOPRIL 20 MG: 20 TABLET ORAL at 08:53

## 2024-06-12 RX ADMIN — VANCOMYCIN HYDROCHLORIDE 1500 MG: 1.5 INJECTION, POWDER, LYOPHILIZED, FOR SOLUTION INTRAVENOUS at 06:43

## 2024-06-12 RX ADMIN — PIPERACILLIN SODIUM AND TAZOBACTAM SODIUM 3.38 G: 3; .375 INJECTION, SOLUTION INTRAVENOUS at 23:15

## 2024-06-12 RX ADMIN — PIPERACILLIN SODIUM AND TAZOBACTAM SODIUM 3.38 G: 3; .375 INJECTION, SOLUTION INTRAVENOUS at 05:49

## 2024-06-12 ASSESSMENT — COGNITIVE AND FUNCTIONAL STATUS - GENERAL
MOBILITY SCORE: 24
MOBILITY SCORE: 24
DAILY ACTIVITIY SCORE: 24

## 2024-06-12 ASSESSMENT — PAIN SCALES - GENERAL
PAINLEVEL_OUTOF10: 0 - NO PAIN
PAINLEVEL_OUTOF10: 3

## 2024-06-12 ASSESSMENT — PAIN - FUNCTIONAL ASSESSMENT
PAIN_FUNCTIONAL_ASSESSMENT: 0-10
PAIN_FUNCTIONAL_ASSESSMENT: 0-10

## 2024-06-12 ASSESSMENT — PAIN DESCRIPTION - LOCATION: LOCATION: BACK

## 2024-06-12 ASSESSMENT — PAIN DESCRIPTION - ORIENTATION: ORIENTATION: RIGHT

## 2024-06-12 NOTE — PROGRESS NOTES
General surgery attending note:  I examined and evaluated the patient.  I discussed the patient with the RUTHIE and reviewed the RUTHIE note.     Impression/plan:  Postop day #2 following incision and drainage of right inferior lateral chest wall abscess.  Packing/dressing change by surgery RUTHIE.    Small amount of purulent drainage.  Swelling, erythema, tenderness much improved.  No leukocytosis.  Abscess culture has no growth to date.  Antibiotics per infectious disease service.  Patient may be discharged home from a surgery standpoint.  His family can help with dressing changes.  He should follow-up in my office 1 week after discharge.    Mook Segovia MD

## 2024-06-12 NOTE — CARE PLAN
The patient's goals for the shift include change dressing and continue antibiotics.     The clinical goals for the shift include patient to be fever free

## 2024-06-12 NOTE — CARE PLAN
Problem: Safety  Goal: I will remain free of falls  Outcome: Progressing     Problem: Safety  Goal: Patient will be injury free during hospitalization  Outcome: Progressing    Problem: Pain  Goal: My pain/discomfort is manageable  Outcome: Progressing     The patient's goals for the shift include  to get comfortable.    The clinical goals for the shift include to not have any fever.

## 2024-06-12 NOTE — PROGRESS NOTES
Saul Colon is a 59 y.o. male on day 3 of admission presenting with Chest wall abscess.      Subjective   Seen at bedside.  Feeling fine.  No overnight events. Dressing with minimal purulent secretion. Culture pending.        Objective     Last Recorded Vitals  /85 (BP Location: Left arm, Patient Position: Lying)   Pulse 69   Temp 36.3 °C (97.3 °F) (Temporal)   Resp 16   Wt 101 kg (223 lb)   SpO2 96%   Intake/Output last 3 Shifts:    Intake/Output Summary (Last 24 hours) at 6/12/2024 1005  Last data filed at 6/12/2024 0643  Gross per 24 hour   Intake 1775 ml   Output --   Net 1775 ml       Admission Weight  Weight: 101 kg (223 lb) (06/09/24 1613)    Daily Weight  06/09/24 : 101 kg (223 lb)    Image Results  CT abdomen pelvis w IV contrast  Narrative: STUDY:  CT Abdomen and Pelvis with IV Contrast; 06/09/2024, 5:47 PM.  INDICATION:  Follow up for abnormal US; soft tissue mass on right trunk.  COMPARISON:  US abdomen: 06/01/24.  ACCESSION NUMBER(S):  [id number]  ORDERING CLINICIAN:  MICHELLE ANTONY  TECHNIQUE:  CT of the abdomen and pelvis was performed.  Contiguous axial images  were obtained at 3 mm slice thickness through the abdomen and pelvis.   Coronal and sagittal reconstructions at 3 mm slice thickness were  performed.  Omnipaque 350 80 mL was administered intravenously.    FINDINGS:  LOWER CHEST:  No cardiomegaly.  No pericardial effusion.  Lung bases are clear.     ABDOMEN:     LIVER:  Hepatic steatosis.     BILE DUCTS:  No intrahepatic or extrahepatic biliary ductal dilatation.     GALLBLADDER:  The gallbladder is normal.  STOMACH:  No abnormalities identified.     PANCREAS:  No masses or ductal dilatation.     SPLEEN:  No splenomegaly or focal splenic lesion.     ADRENAL GLANDS:  No thickening or nodules.     KIDNEYS AND URETERS:  Kidneys are normal in size and location.  No renal or ureteral  calculi.     PELVIS:     BLADDER:  No abnormalities identified.     REPRODUCTIVE ORGANS:  No  abnormalities identified.     BOWEL:  No abnormalities identified.     VESSELS:  No abnormalities identified.  Abdominal aorta is normal in caliber.      PERITONEUM/RETROPERITONEUM/LYMPH NODES:  No free fluid.  No pneumoperitoneum.  No lymphadenopathy.     ABDOMINAL WALL:  Within the right basal pleural space there is a small collection  measuring 5.8 x 1.4 cm axial image 44. Stranding is noted along the  inferior margin of the pleura extending into the lateral margin of the  retroperitoneum with a tiny fluid collection in the right abdominal  wall musculature measuring 2.3 cm axial image 83 and a linear  collection versus mass measuring 3.4 x 2.6 cm within the right lateral  abdominal wall at the level of the inferior ribs axial image 71  probably visualized on prior ultrasound..  SOFT TISSUES:   No abnormalities identified.     BONES:  No acute fracture or aggressive osseous lesion.  Impression: Multiple small collections versus with likely mass lesions, at the  right inferior margin of the pleural space extending into the right  lateral peritoneal space and right upper quadrant abdominal wall. The  second largest collection was probably previously visualized by  ultrasound.  Signed by John Chavez MD      Physical Exam  General: alert and oriented. No acute distress.  HEENT: oral mucosa moist, EOMI.  CHEST: clear breath sounds, no crackles, no wheeze, no tachypnea.  CVS: regular rate and rhythm, no murmurs.   ABD: Soft, Non Tender, BS present.  Rectus muscle diastasis.   EXT: no edema.  SKIN: no rash.  Right chest/flank-inferior wall  with dressing s/p I&D.   NEURO: Nonfocal grossly.      Assessment/Plan        Right chest/flank wall abscess s/p drainage on 6/10  -h/o perforated appendix c/b perihepatic abscess (Ecoli and anaerobes) 2016   --Continue Zosyn and vancomycin  --Seen by infectious disease, cultures pending for further recommendations.    --Seen by general surgery, needs follow-up as an outpatient  in 2 weeks to repeat imaging and decide if further evaluation is needed.    Diabetes type II  -home med metformin  -ISS #2    Chronic conditions  Hypertension  Dyslipidemia  GERD  -Continue home meds    Diet: regular  Lines/Devices: peripheral   Drips: -  ATBx: Zosyn and vancomycin day 4  PPX: Pantoprazole and enoxaparin  Code: Full code  Dispo: MedSurg  Discharge planning: Going home.    *This document was created using dragon dictation and may contain unintended error.           Gris Ibanez MD

## 2024-06-12 NOTE — PROGRESS NOTES
Saul Colon is a 59 y.o. male on day 3 of admission presenting with Chest wall abscess.    Subjective   Patient states he only has pain during packing changes. He is feeling well. Culture result still showing no growth to date.       Objective     Physical Exam  Constitutional:       General: He is not in acute distress.     Appearance: He is normal weight. He is not ill-appearing or toxic-appearing.   HENT:      Head: Normocephalic.      Nose: Nose normal.      Mouth/Throat:      Mouth: Mucous membranes are moist.   Eyes:      General: No scleral icterus.  Pulmonary:      Effort: Pulmonary effort is normal. No respiratory distress.   Skin:     General: Skin is warm.      Comments: Right flank abscess s/p I&D - 2cm area of opening - less deep, packing purulent but no active drainage. Packing changed at bedside.   Surrounding erythema nearly resolved. Quite tender    Neurological:      Mental Status: He is alert and oriented to person, place, and time.   Psychiatric:         Mood and Affect: Mood normal.         Behavior: Behavior normal.         Last Recorded Vitals  Blood pressure 139/81, pulse 76, temperature 36.4 °C (97.5 °F), temperature source Temporal, resp. rate 16, height 1.829 m (6'), weight 101 kg (223 lb), SpO2 96%.  Intake/Output last 3 Shifts:  I/O last 3 completed shifts:  In: 2075 (20.5 mL/kg) [P.O.:475; IV Piggyback:1600]  Out: - (0 mL/kg)   Weight: 101.2 kg     Relevant Results  Results for orders placed or performed during the hospital encounter of 06/09/24 (from the past 24 hour(s))   POCT GLUCOSE   Result Value Ref Range    POCT Glucose 178 (H) 74 - 99 mg/dL   CBC   Result Value Ref Range    WBC 8.6 4.4 - 11.3 x10*3/uL    nRBC 0.0 0.0 - 0.0 /100 WBCs    RBC 4.86 4.50 - 5.90 x10*6/uL    Hemoglobin 14.2 13.5 - 17.5 g/dL    Hematocrit 43.6 41.0 - 52.0 %    MCV 90 80 - 100 fL    MCH 29.2 26.0 - 34.0 pg    MCHC 32.6 32.0 - 36.0 g/dL    RDW 13.9 11.5 - 14.5 %    Platelets 407 150 - 450 x10*3/uL    PST Top   Result Value Ref Range    Extra Tube Hold for add-ons.    POCT GLUCOSE   Result Value Ref Range    POCT Glucose 118 (H) 74 - 99 mg/dL   POCT GLUCOSE   Result Value Ref Range    POCT Glucose 97 74 - 99 mg/dL         Assessment/Plan   Principal Problem:    Chest wall abscess    59 year old male with a history significant for HTN, HLD, NIDDM Type 2, prior appendectomy for perforated appendicitis (2013) c/b perihepatic abscess (2016- E.Coli), and urachal cyst remnant s/p open excision (2016) presented to [facility] ED from home on 6/10 with worsening redness and tenderness to right flank mass.       Impression:  Right flank abscess    Likely multi-loculated abscess related to previous perihepatic drain     Procedures:  6/10 - Bedside I&D right flank/chest wall abscess - Dr. Segovia      Recommendations:  - Follow up culture results     > NGTD x 2 days  - Surgery team to change packing daily while admitted    > Pt will need to continue packing changes at home. Has family in the medical field who can help.  - Okay for diet  - Defer to ID for antibiotic management, but ideally de-escalate soon  - Okay for DVT chemoprophylaxis   - Symptom management/remainder of care per medical team     Dispo: Okay for discharge from surgery perspective.    Outpatient follow up with Dr. Segovia in 1-2 weeks post discharge; anticipate repeat CT at that time      The patient was seen and discussed with the attending surgeon Dr. Segovia.     Adry Rueda PA-C

## 2024-06-12 NOTE — PROGRESS NOTES
"Infectious disease progress note  Subjective   Right flank abscess    Antibiotics  Vancomycin day 3  Zosyn day 3    Objective   Range of Vitals (last 24 hours)  Heart Rate:  [65-90]   Temp:  [35.9 °C (96.6 °F)-37.1 °C (98.8 °F)]   Resp:  [16]   BP: (114-148)/(79-85)   SpO2:  [93 %-96 %]   Daily Weight  06/09/24 : 101 kg (223 lb)    Body mass index is 30.24 kg/m².      Physical Exam  HEENT PERRLA    Chest fair entry bilaterally  CVS S1-S2 regular  Patient has right flank wound which is packed    Relevant Results  Labs  Lab Results   Component Value Date    WBC 8.6 06/12/2024    HGB 14.2 06/12/2024    HCT 43.6 06/12/2024    MCV 90 06/12/2024     06/12/2024     Lab Results   Component Value Date    GLUCOSE 112 (H) 06/11/2024    CALCIUM 9.7 06/11/2024     06/11/2024    K 4.6 06/11/2024    CO2 27 06/11/2024     06/11/2024    BUN 15 06/11/2024    CREATININE 0.97 06/11/2024   ESR: --No results found for: \"SEDRATE\"No results found for: \"CRP\"  Lab Results   Component Value Date    ALT 27 06/09/2024    AST 18 06/09/2024    ALKPHOS 86 06/09/2024    BILITOT 0.4 06/09/2024       Microbiology  6-9-2024 blood cultures no growth 2 days  6- wound cultures no growth to date pending    Imaging  6-9-2024 CT abdomen pelvis shows multiple small collections versus likely mass lesions at the right inferior margin of the pleural space extending to the right lateral peritoneal space and right upper quadrant abdominal wall    Assessment/Plan   1.  Continue current antibiotics  2.  We will follow-up pending cultures and then de-escalate antibiotics to oral  3.  Once patient gets discharged I will see him at the wound center    Other issues  Diabetes mellitus puts patient at high risk for infections  Hyperlipidemia  Hypertension    I reviewed and interpreted all lab test imaging studies and documentations from other healthcare providers  I am monitoring for antibiotic side effects and toxicity     Ro Madison MD  "

## 2024-06-13 ENCOUNTER — APPOINTMENT (OUTPATIENT)
Dept: RADIOLOGY | Facility: HOSPITAL | Age: 59
End: 2024-06-13
Payer: COMMERCIAL

## 2024-06-13 PROBLEM — L02.213 CHEST WALL ABSCESS: Status: RESOLVED | Noted: 2024-06-09 | Resolved: 2024-06-13

## 2024-06-13 LAB
ALBUMIN SERPL BCP-MCNC: 4.2 G/DL (ref 3.4–5)
ANION GAP SERPL CALC-SCNC: 13 MMOL/L (ref 10–20)
BACTERIA FLD CULT: NORMAL
BUN SERPL-MCNC: 12 MG/DL (ref 6–23)
CALCIUM SERPL-MCNC: 9.5 MG/DL (ref 8.6–10.3)
CHLORIDE SERPL-SCNC: 103 MMOL/L (ref 98–107)
CO2 SERPL-SCNC: 26 MMOL/L (ref 21–32)
CREAT SERPL-MCNC: 0.88 MG/DL (ref 0.5–1.3)
EGFRCR SERPLBLD CKD-EPI 2021: >90 ML/MIN/1.73M*2
ERYTHROCYTE [DISTWIDTH] IN BLOOD BY AUTOMATED COUNT: 14 % (ref 11.5–14.5)
GLUCOSE BLD MANUAL STRIP-MCNC: 100 MG/DL (ref 74–99)
GLUCOSE BLD MANUAL STRIP-MCNC: 112 MG/DL (ref 74–99)
GLUCOSE BLD MANUAL STRIP-MCNC: 155 MG/DL (ref 74–99)
GLUCOSE BLD MANUAL STRIP-MCNC: 185 MG/DL (ref 74–99)
GLUCOSE SERPL-MCNC: 109 MG/DL (ref 74–99)
GRAM STN SPEC: NORMAL
GRAM STN SPEC: NORMAL
HCT VFR BLD AUTO: 41.8 % (ref 41–52)
HGB BLD-MCNC: 13.8 G/DL (ref 13.5–17.5)
MCH RBC QN AUTO: 29.6 PG (ref 26–34)
MCHC RBC AUTO-ENTMCNC: 33 G/DL (ref 32–36)
MCV RBC AUTO: 90 FL (ref 80–100)
NRBC BLD-RTO: 0 /100 WBCS (ref 0–0)
PHOSPHATE SERPL-MCNC: 3.5 MG/DL (ref 2.5–4.9)
PLATELET # BLD AUTO: 391 X10*3/UL (ref 150–450)
POTASSIUM SERPL-SCNC: 4.5 MMOL/L (ref 3.5–5.3)
RBC # BLD AUTO: 4.66 X10*6/UL (ref 4.5–5.9)
SODIUM SERPL-SCNC: 137 MMOL/L (ref 136–145)
VANCOMYCIN SERPL-MCNC: 30.8 UG/ML (ref 5–20)
WBC # BLD AUTO: 8.7 X10*3/UL (ref 4.4–11.3)

## 2024-06-13 PROCEDURE — 74177 CT ABD & PELVIS W/CONTRAST: CPT | Performed by: RADIOLOGY

## 2024-06-13 PROCEDURE — 2500000002 HC RX 250 W HCPCS SELF ADMINISTERED DRUGS (ALT 637 FOR MEDICARE OP, ALT 636 FOR OP/ED): Performed by: STUDENT IN AN ORGANIZED HEALTH CARE EDUCATION/TRAINING PROGRAM

## 2024-06-13 PROCEDURE — 2500000001 HC RX 250 WO HCPCS SELF ADMINISTERED DRUGS (ALT 637 FOR MEDICARE OP): Performed by: STUDENT IN AN ORGANIZED HEALTH CARE EDUCATION/TRAINING PROGRAM

## 2024-06-13 PROCEDURE — 2500000004 HC RX 250 GENERAL PHARMACY W/ HCPCS (ALT 636 FOR OP/ED): Performed by: INTERNAL MEDICINE

## 2024-06-13 PROCEDURE — 99024 POSTOP FOLLOW-UP VISIT: CPT | Performed by: SURGERY

## 2024-06-13 PROCEDURE — 36415 COLL VENOUS BLD VENIPUNCTURE: CPT | Performed by: INTERNAL MEDICINE

## 2024-06-13 PROCEDURE — 1200000002 HC GENERAL ROOM WITH TELEMETRY DAILY

## 2024-06-13 PROCEDURE — 82947 ASSAY GLUCOSE BLOOD QUANT: CPT

## 2024-06-13 PROCEDURE — 74177 CT ABD & PELVIS W/CONTRAST: CPT

## 2024-06-13 PROCEDURE — 99232 SBSQ HOSP IP/OBS MODERATE 35: CPT | Performed by: STUDENT IN AN ORGANIZED HEALTH CARE EDUCATION/TRAINING PROGRAM

## 2024-06-13 PROCEDURE — 84100 ASSAY OF PHOSPHORUS: CPT | Performed by: INTERNAL MEDICINE

## 2024-06-13 PROCEDURE — 36415 COLL VENOUS BLD VENIPUNCTURE: CPT | Performed by: STUDENT IN AN ORGANIZED HEALTH CARE EDUCATION/TRAINING PROGRAM

## 2024-06-13 PROCEDURE — 80202 ASSAY OF VANCOMYCIN: CPT | Performed by: INTERNAL MEDICINE

## 2024-06-13 PROCEDURE — 2500000004 HC RX 250 GENERAL PHARMACY W/ HCPCS (ALT 636 FOR OP/ED): Performed by: STUDENT IN AN ORGANIZED HEALTH CARE EDUCATION/TRAINING PROGRAM

## 2024-06-13 PROCEDURE — 2550000001 HC RX 255 CONTRASTS: Performed by: INTERNAL MEDICINE

## 2024-06-13 PROCEDURE — 85027 COMPLETE CBC AUTOMATED: CPT | Performed by: STUDENT IN AN ORGANIZED HEALTH CARE EDUCATION/TRAINING PROGRAM

## 2024-06-13 RX ADMIN — IOHEXOL 75 ML: 350 INJECTION, SOLUTION INTRAVENOUS at 16:14

## 2024-06-13 RX ADMIN — ENOXAPARIN SODIUM 40 MG: 40 INJECTION SUBCUTANEOUS at 22:56

## 2024-06-13 RX ADMIN — PIPERACILLIN SODIUM AND TAZOBACTAM SODIUM 3.38 G: 3; .375 INJECTION, SOLUTION INTRAVENOUS at 22:56

## 2024-06-13 RX ADMIN — ATORVASTATIN CALCIUM 80 MG: 80 TABLET, FILM COATED ORAL at 08:30

## 2024-06-13 RX ADMIN — VANCOMYCIN HYDROCHLORIDE 1500 MG: 1.5 INJECTION, POWDER, LYOPHILIZED, FOR SOLUTION INTRAVENOUS at 06:33

## 2024-06-13 RX ADMIN — PIPERACILLIN SODIUM AND TAZOBACTAM SODIUM 3.38 G: 3; .375 INJECTION, SOLUTION INTRAVENOUS at 13:02

## 2024-06-13 RX ADMIN — INSULIN LISPRO 1 UNITS: 100 INJECTION, SOLUTION INTRAVENOUS; SUBCUTANEOUS at 17:24

## 2024-06-13 RX ADMIN — LISINOPRIL 20 MG: 20 TABLET ORAL at 08:30

## 2024-06-13 RX ADMIN — VANCOMYCIN HYDROCHLORIDE 1500 MG: 1.5 INJECTION, POWDER, LYOPHILIZED, FOR SOLUTION INTRAVENOUS at 17:24

## 2024-06-13 RX ADMIN — PANTOPRAZOLE SODIUM 20 MG: 20 TABLET, DELAYED RELEASE ORAL at 08:30

## 2024-06-13 RX ADMIN — PIPERACILLIN SODIUM AND TAZOBACTAM SODIUM 3.38 G: 3; .375 INJECTION, SOLUTION INTRAVENOUS at 05:59

## 2024-06-13 ASSESSMENT — COGNITIVE AND FUNCTIONAL STATUS - GENERAL
DAILY ACTIVITIY SCORE: 24
MOBILITY SCORE: 24
MOBILITY SCORE: 24

## 2024-06-13 ASSESSMENT — PAIN SCALES - GENERAL
PAINLEVEL_OUTOF10: 0 - NO PAIN
PAINLEVEL_OUTOF10: 0 - NO PAIN

## 2024-06-13 NOTE — PROGRESS NOTES
Saul Colon is a 59 y.o. male on day 4 of admission presenting with Chest wall abscess.      Subjective   Seen at bedside.  Feeling fine.  No overnight events. Dressing changed. ID wants repeat to repeat CT AP with contrast to reassess abscess drainage.    Objective     Last Recorded Vitals  /59 (BP Location: Left arm, Patient Position: Lying)   Pulse 84   Temp 35.9 °C (96.6 °F) (Temporal)   Resp 18   Wt 101 kg (223 lb)   SpO2 94%   Intake/Output last 3 Shifts:  No intake or output data in the 24 hours ending 06/13/24 1600      Admission Weight  Weight: 101 kg (223 lb) (06/09/24 1613)    Daily Weight  06/09/24 : 101 kg (223 lb)    Image Results  CT abdomen pelvis w IV contrast   IMPRESSION:  Multiple small collections versus with likely mass lesions, at the  right inferior margin of the pleural space extending into the right  lateral peritoneal space and right upper quadrant abdominal wall. The  second largest collection was probably previously visualized by  ultrasound.  Signed by John Chavez MD    Physical Exam  General: alert and oriented. No acute distress.  HEENT: oral mucosa moist, EOMI.  CHEST: clear breath sounds, no crackles, no wheeze, no tachypnea.  CVS: regular rate and rhythm, no murmurs.   ABD: Soft, Non Tender, BS present.  Rectus muscle diastasis.   EXT: no edema.  SKIN: no rash.  Right chest/flank-inferior wall  with dressing s/p I&D.   NEURO: Nonfocal grossly.      Assessment/Plan        Right chest/flank wall abscess s/p drainage on 6/10  -h/o perforated appendix c/b perihepatic abscess (Ecoli and anaerobes) 2016   --Continue Zosyn and vancomycin  --Seen by infectious disease, final cultures pending for further recommendations.    --Seen by general surgery, needs follow-up as an outpatient in 1 week.  --6/13/2024 pending repeat CT abdomen to reassess abscess    Diabetes type II  -home med metformin  -ISS #2    Chronic conditions  Hypertension  Dyslipidemia  GERD  -Continue  home meds    Diet: regular  Lines/Devices: peripheral   Drips: -  ATBx: Zosyn and vancomycin day 4  PPX: Pantoprazole and enoxaparin  Code: Full code  Dispo: MedSurg  Discharge planning: Going home.    *This document was created using dragon dictation and may contain unintended error.           Gris Ibanez MD

## 2024-06-13 NOTE — CARE PLAN
The patient's goals for the shift include  safety and comfort    The clinical goals for the shift include Patient to continue antibiotics

## 2024-06-13 NOTE — PROGRESS NOTES
Vancomycin Dosing by Pharmacy- FOLLOW UP    Saul Colon is a 59 y.o. year old male who Pharmacy has been consulted for vancomycin dosing for intra-abdominal infection. Based on the patient's indication and renal status this patient is being dosed based on a goal AUC of 400-600.     Renal function is currently stable.    Current vancomycin dose: 1500 mg given every 12 hours    Estimated vancomycin AUC on current dose: 495 mg/L.hr     Visit Vitals  /84   Pulse 66   Temp 36.2 °C (97.2 °F)   Resp 16        Lab Results   Component Value Date    CREATININE 0.88 2024    CREATININE 0.97 2024    CREATININE 0.90 06/10/2024    CREATININE 0.93 2024        Patient weight is as follows:   Vitals:    24 1613   Weight: 101 kg (223 lb)       Cultures:  No results found for the encounter in last 14 days.       I/O last 3 completed shifts:  In: 1775 (17.5 mL/kg) [P.O.:475; IV Piggyback:1300]  Out: - (0 mL/kg)   Weight: 101.2 kg   I/O during current shift:  No intake/output data recorded.    Temp (24hrs), Av.1 °C (97 °F), Min:35.9 °C (96.6 °F), Max:36.4 °C (97.5 °F)      Assessment/Plan    Within goal AUC range. Continue current vancomycin regimen.  Vancomycin level of 30.8 micrograms/mL due to it being drawn 1 hour after completion of dose.    This dosing regimen is predicted by [employer] to result in the following pharmacokinetic parameters:  Regimen: 1500 mg IV every 12 hours.  Start time: 18:33 on 2024  Exposure target: AUC24 (range)400-600 mg/L.hr   AUC24,ss: 495 mg/L.hr  Probability of AUC24 > 400: 91 %  Ctrough,ss: 12.9 mg/L  Probability of Ctrough,ss > 20: 8 %  Probability of nephrotoxicity (Lodise KIM ): 8 %      The next level will be obtained on  at 1400. May be obtained sooner if clinically indicated.    Will continue to monitor renal function daily while on vancomycin and order serum creatinine at least every 48 hours if not already ordered.  Follow for continued  vancomycin needs, clinical response, and signs/symptoms of toxicity.       Toshia Kearns, PharmD

## 2024-06-13 NOTE — DISCHARGE SUMMARY
Discharge Diagnosis  Right chest/flank wall abscess s/p drainage on 6/10 , s/p vanc, zosyn and Augmentin, total of 14 days of antibiotics.  Lung nodules  Nodular density with some coarse calcification within the pelvis abutting portion of sigmoid colon.  Diabetes type 2  Hypertension  Dyslipidemia  Atherosclerotic vascular disease,aneurysmal dilation abdominal aorta up to 2.3 cm.   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       aspirin 81 mg EC tablet      Take 1 tablet (81 mg) by mouth once daily.              CONTINUE taking these medications        Instructions Last Dose Given Next Dose Due   atorvastatin 80 mg tablet  Commonly known as: Lipitor           FreeStyle Lite Strips strip  Generic drug: blood sugar diagnostic      USE TO CHECK BLOOD SUGAR ONCE DAILY       lisinopril 20 mg tablet           metFORMIN 500 mg tablet  Commonly known as: Glucophage                     Where to Get Your Medications        These medications were sent to [place] [id number] [facility]  [address] [facility] [address]      Phone: [phone]   amoxicillin-pot clavulanate 875-125 mg tablet  aspirin 81 mg EC tablet         Test Results Pending At Discharge  Pending Labs       Order Current Status    AFB Culture/Smear In process    Fungal Culture/Smear In process            Relevant results    CT abdomen pelvis w IV contrast  1.  Redemonstration heterogeneous masslike density/heterogeneous collection at the right lateral abdominal wall although slightly decreased in size compared to prior. There is also heterogeneous thickening of the right lateral abdominal wall musculature extending  more inferiorly which is similar and additional smaller associated low-density focus collection/abscess within the right lateral abdominal wall musculature at this location remains but also decreased in size. Heterogeneous thickening/stranding extends from this region abutting posterior right hepatic lobe and extending to the posterior retroperitoneum and pleura at the right lung base with apparent pleural thickening and probable loculated fluid and/or empyema. Focal consolidative opacity at the right lung base remains which contains approximate 2 cm hypoattenuating focus concerning for abscess but also slightly decreased compared to prior. Overall findings felt most likely relating to sequela of infectious/inflammatory process with abscesses and abdominal wall/pleural fistula. Underlying malignant process cannot be entirely excluded and close follow-up to ensure improvement/resolution is recommended and tissue sampling may also be considered. Component of hematoma involving the right lateral abdominal musculature would also be possible.    2. Redemonstration of spiculated nodular density with some coarse calcification within the pelvis abutting portion of sigmoid colon as described and could be related to sequela of infectious/inflammatory or neoplastic process including carcinoid or sigmoid tumor among others. However this has overall decreased compared to 2016 and therefore benign process would be favored. There is also mild nonspecific thickened appearance at the distal colon/rectum. Close clinical correlation and follow-up advised and correlation with colonoscopy may also be considered as a means of further assessment.    3. Tubular structure within the anterior abdomen just beneath the anterior abdominal wall about the region of the umbilicus of uncertain etiology and significance, could be sequela of previously described urachal remnant and/or associated surgical changes.    4. Mildly enlarged prostate  gland.    5. Atherosclerotic vascular disease as above with mild ectatic/aneurysmal dilatation infrarenal abdominal aorta up to 2.3 cm  similar to prior. Focal apparent soft plaque at the origin of the SMA causing apparent marked stenosis as described although evaluation otherwise limited on this examination. Correlation with dedicated CTA may be considered for further assessment as clinically warranted.    6. Subcentimeter lung nodules as described for which follow-up recommended.    MACRO:  None      Signed by: Jordy Marie 6/13/2024 6:37 PM  Dictation workstation:   [id number]    Hospital Course  This is a 59-year-old male with underlying conditions listed above who presented to the ED on 6/9/2024 due to right chest/flank abscess.  His PCP order an outpatient ultrasound which showed a 1.6 x 1.5 x 1.5 cm solid hypoechoic mass with recommendation for follow-up with CT with contrast.  Due to worsening he came to the ED.  CT imaging showed multiple small collections at right inferior margin of the pleural space extending into the right lateral peritoneal space and right upper quadrant abdomen wall.  Consulted general surgery, I&D on 6/10/2024 bedside.  Consulted infectious disease while inpatient using Zosyn and vancomycin.  Will complete antibiotics outpatient with Augmentin.  Wound culture showed no growth to date, ordered additional  AFB culture and fungal culture.  Repeat CT AP with contrast with changes as above.  Placed a referral for interventional radiology for US soft tissue guided biopsy.  Seen by vascular surgery, added aspirin as a home med.  Patient is stable to be discharged and continue follow-up as an outpatient as listed below.    Issues Requiring Follow-Up  -General surgery 1 week, Dr. Segovia.   -Interventional radiology for US guided soft tissue biopsy (right sided-see CT report), placed referral,  from IR will call patient.   -Lung nodule clinic due to lung nodules.  -GI for colonoscopy  (sigmoid changes- see CT report), has an appointment for July.  -Vascular surgery due to atherosclerotic vascular disease, aneurysmal dilation abdominal aorta up to 2.3 cm.       Pertinent Physical Exam At Time of Discharge  Physical Exam  General: alert and oriented. No acute distress.  HEENT: oral mucosa moist, EOMI.  CHEST: clear breath sounds, no crackles, no wheeze, no tachypnea.  CVS: regular rate and rhythm, no murmurs.   ABD: Soft, Non Tender, BS present.  Rectus muscle diastasis.   EXT: no edema.  SKIN: no rash.  Right chest/flank-inferior wall  with dressing s/p I&D.   NEURO: Nonfocal grossly.       Outpatient Follow-Up  Future Appointments   Date Time Provider Department Center   7/11/2024  2:10 PM Gerald Taylor MD [address]         Gris Ibanez MD

## 2024-06-13 NOTE — PROGRESS NOTES
"Infectious disease progress note  Subjective   Right flank abscess    Antibiotics  Vancomycin, Zosyn day 4    Objective   Range of Vitals (last 24 hours)  Heart Rate:  [66-76]   Temp:  [35.9 °C (96.6 °F)-36.4 °C (97.5 °F)]   Resp:  [16]   BP: (120-147)/(69-85)   SpO2:  [93 %-96 %]   Daily Weight  06/09/24 : 101 kg (223 lb)    Body mass index is 30.24 kg/m².      Physical Exam  HEENT PERRLA   Chest fair entry bilaterally  CVS S1-S2 regular  Patient has right flank wound which is packed      Relevant Results  Labs  Lab Results   Component Value Date    WBC 8.7 06/13/2024    HGB 13.8 06/13/2024    HCT 41.8 06/13/2024    MCV 90 06/13/2024     06/13/2024     Lab Results   Component Value Date    GLUCOSE 109 (H) 06/13/2024    CALCIUM 9.5 06/13/2024     06/13/2024    K 4.5 06/13/2024    CO2 26 06/13/2024     06/13/2024    BUN 12 06/13/2024    CREATININE 0.88 06/13/2024   ESR: --No results found for: \"SEDRATE\"No results found for: \"CRP\"  Lab Results   Component Value Date    ALT 27 06/09/2024    AST 18 06/09/2024    ALKPHOS 86 06/09/2024    BILITOT 0.4 06/09/2024       Microbiology  6-9-2024 blood cultures no growth 2 days  6- wound cultures no growth to date pending    Imaging  6-9-2024 CT abdomen pelvis shows multiple small collections versus likely mass lesions at the right inferior margin of the pleural space extending to the right lateral peritoneal space and right upper quadrant abdominal wall     Assessment/Plan   1.  For now continue IV antibiotics  2.  Will order a CT of the abdomen to reassess the abscess drainage along with my concern of the fluid extending towards the pleura.  3.  I do have a call out to Dr. Segovia the surgeon    Case discussed with primary care physician and patient in detail    Other issues  Diabetes mellitus puts patient at high risk for infections  Hyperlipidemia  Hypertension  I reviewed and interpreted all lab test imaging studies and documentations from other " healthcare providers  I am monitoring for antibiotic side effects and toxicity     Ro Madison MD

## 2024-06-13 NOTE — PROGRESS NOTES
Saul Colon is a 59 y.o. male on day 4 of admission presenting with Chest wall abscess.    Subjective   Pt reports he was able to sleep on his right side for the first time last night. Pain is significantly improved, only painful with packing changes.        Objective     Physical Exam  Constitutional:       General: He is not in acute distress.     Appearance: He is normal weight. He is not ill-appearing or toxic-appearing.   HENT:      Head: Normocephalic.      Nose: Nose normal.      Mouth/Throat:      Mouth: Mucous membranes are moist.   Eyes:      General: No scleral icterus.  Pulmonary:      Effort: Pulmonary effort is normal. No respiratory distress.   Skin:     General: Skin is warm.      Comments: Right flank abscess s/p I&D - 2cm area of opening - less deep, packing purulent but no active drainage. Packing changed at bedside.   Induration improving, erythema resolved. Quite tender    Neurological:      Mental Status: He is alert and oriented to person, place, and time.   Psychiatric:         Mood and Affect: Mood normal.         Behavior: Behavior normal.         Last Recorded Vitals  Blood pressure 147/84, pulse 66, temperature 36.2 °C (97.2 °F), resp. rate 16, height 1.829 m (6'), weight 101 kg (223 lb), SpO2 96%.  Intake/Output last 3 Shifts:  I/O last 3 completed shifts:  In: 1775 (17.5 mL/kg) [P.O.:475; IV Piggyback:1300]  Out: - (0 mL/kg)   Weight: 101.2 kg     Relevant Results  Results for orders placed or performed during the hospital encounter of 06/09/24 (from the past 24 hour(s))   POCT GLUCOSE   Result Value Ref Range    POCT Glucose 97 74 - 99 mg/dL   POCT GLUCOSE   Result Value Ref Range    POCT Glucose 143 (H) 74 - 99 mg/dL   POCT GLUCOSE   Result Value Ref Range    POCT Glucose 200 (H) 74 - 99 mg/dL   Renal Function Panel   Result Value Ref Range    Glucose 109 (H) 74 - 99 mg/dL    Sodium 137 136 - 145 mmol/L    Potassium 4.5 3.5 - 5.3 mmol/L    Chloride 103 98 - 107 mmol/L     Bicarbonate 26 21 - 32 mmol/L    Anion Gap 13 10 - 20 mmol/L    Urea Nitrogen 12 6 - 23 mg/dL    Creatinine 0.88 0.50 - 1.30 mg/dL    eGFR >90 >60 mL/min/1.73m*2    Calcium 9.5 8.6 - 10.3 mg/dL    Phosphorus 3.5 2.5 - 4.9 mg/dL    Albumin 4.2 3.4 - 5.0 g/dL   CBC   Result Value Ref Range    WBC 8.7 4.4 - 11.3 x10*3/uL    nRBC 0.0 0.0 - 0.0 /100 WBCs    RBC 4.66 4.50 - 5.90 x10*6/uL    Hemoglobin 13.8 13.5 - 17.5 g/dL    Hematocrit 41.8 41.0 - 52.0 %    MCV 90 80 - 100 fL    MCH 29.6 26.0 - 34.0 pg    MCHC 33.0 32.0 - 36.0 g/dL    RDW 14.0 11.5 - 14.5 %    Platelets 391 150 - 450 x10*3/uL   POCT GLUCOSE   Result Value Ref Range    POCT Glucose 112 (H) 74 - 99 mg/dL         Assessment/Plan   Principal Problem:    Chest wall abscess    59 year old male with a history significant for HTN, HLD, NIDDM Type 2, prior appendectomy for perforated appendicitis (2013) c/b perihepatic abscess (2016- E.Coli), and urachal cyst remnant s/p open excision (2016) presented to [facility] ED from home on 6/10 with worsening redness and tenderness to right flank mass.       Impression:  Right flank abscess    Likely multi-loculated abscess related to previous perihepatic drain     Procedures:  6/10 - Bedside I&D right flank/chest wall abscess - Dr. Segovia      Recommendations:  - Follow up culture results     > NGTD x 2 days  - Surgery team to change packing daily while admitted    > Pt will need to continue packing changes at home. Has family in the medical field who can help.  - Okay for diet  - Defer to ID for antibiotic management, but ideally de-escalate soon  - Okay for DVT chemoprophylaxis   - Symptom management/remainder of care per medical team     Dispo: Okay for discharge from surgery perspective.    Outpatient follow up with Dr. Segovia in 1 week post discharge; will likely need repeat CT in the future to assess for resolution of fluid collection.     The patient will be seen and discussed with the attending surgeon Dr. Segovia.      Adry Rueda PA-C

## 2024-06-14 ENCOUNTER — APPOINTMENT (OUTPATIENT)
Dept: RADIOLOGY | Facility: HOSPITAL | Age: 59
End: 2024-06-14
Payer: COMMERCIAL

## 2024-06-14 VITALS
BODY MASS INDEX: 30.2 KG/M2 | HEIGHT: 72 IN | SYSTOLIC BLOOD PRESSURE: 151 MMHG | OXYGEN SATURATION: 94 % | RESPIRATION RATE: 18 BRPM | HEART RATE: 64 BPM | WEIGHT: 223 LBS | DIASTOLIC BLOOD PRESSURE: 88 MMHG | TEMPERATURE: 96.8 F

## 2024-06-14 PROBLEM — K55.1 SUPERIOR MESENTERIC ARTERY STENOSIS (MULTI): Status: ACTIVE | Noted: 2024-06-14

## 2024-06-14 LAB
BACTERIA BLD CULT: NORMAL
BACTERIA BLD CULT: NORMAL
GLUCOSE BLD MANUAL STRIP-MCNC: 107 MG/DL (ref 74–99)
GLUCOSE BLD MANUAL STRIP-MCNC: 119 MG/DL (ref 74–99)
HOLD SPECIMEN: NORMAL

## 2024-06-14 PROCEDURE — 2500000004 HC RX 250 GENERAL PHARMACY W/ HCPCS (ALT 636 FOR OP/ED): Mod: JZ | Performed by: INTERNAL MEDICINE

## 2024-06-14 PROCEDURE — 87102 FUNGUS ISOLATION CULTURE: CPT | Mod: PARLAB | Performed by: REGISTERED NURSE

## 2024-06-14 PROCEDURE — 2500000004 HC RX 250 GENERAL PHARMACY W/ HCPCS (ALT 636 FOR OP/ED): Performed by: STUDENT IN AN ORGANIZED HEALTH CARE EDUCATION/TRAINING PROGRAM

## 2024-06-14 PROCEDURE — 99222 1ST HOSP IP/OBS MODERATE 55: CPT | Performed by: NURSE PRACTITIONER

## 2024-06-14 PROCEDURE — 99232 SBSQ HOSP IP/OBS MODERATE 35: CPT | Performed by: SURGERY

## 2024-06-14 PROCEDURE — 2500000001 HC RX 250 WO HCPCS SELF ADMINISTERED DRUGS (ALT 637 FOR MEDICARE OP): Performed by: STUDENT IN AN ORGANIZED HEALTH CARE EDUCATION/TRAINING PROGRAM

## 2024-06-14 PROCEDURE — 2500000002 HC RX 250 W HCPCS SELF ADMINISTERED DRUGS (ALT 637 FOR MEDICARE OP, ALT 636 FOR OP/ED): Performed by: STUDENT IN AN ORGANIZED HEALTH CARE EDUCATION/TRAINING PROGRAM

## 2024-06-14 PROCEDURE — 87116 MYCOBACTERIA CULTURE: CPT | Mod: PARLAB | Performed by: REGISTERED NURSE

## 2024-06-14 PROCEDURE — 99239 HOSP IP/OBS DSCHRG MGMT >30: CPT | Performed by: STUDENT IN AN ORGANIZED HEALTH CARE EDUCATION/TRAINING PROGRAM

## 2024-06-14 PROCEDURE — 82947 ASSAY GLUCOSE BLOOD QUANT: CPT

## 2024-06-14 RX ORDER — AMOXICILLIN AND CLAVULANATE POTASSIUM 875; 125 MG/1; MG/1
875 TABLET, FILM COATED ORAL 2 TIMES DAILY
Qty: 28 TABLET | Refills: 0 | Status: SHIPPED | OUTPATIENT
Start: 2024-06-15 | End: 2024-06-14

## 2024-06-14 RX ORDER — ASPIRIN 81 MG/1
81 TABLET ORAL DAILY
Qty: 30 TABLET | Refills: 1 | Status: SHIPPED | OUTPATIENT
Start: 2024-06-14 | End: 2024-08-13

## 2024-06-14 RX ORDER — AMOXICILLIN AND CLAVULANATE POTASSIUM 875; 125 MG/1; MG/1
875 TABLET, FILM COATED ORAL 2 TIMES DAILY
Qty: 18 TABLET | Refills: 0 | Status: SHIPPED | OUTPATIENT
Start: 2024-06-14 | End: 2024-06-23

## 2024-06-14 RX ADMIN — LISINOPRIL 20 MG: 20 TABLET ORAL at 08:41

## 2024-06-14 RX ADMIN — PANTOPRAZOLE SODIUM 20 MG: 20 TABLET, DELAYED RELEASE ORAL at 08:41

## 2024-06-14 RX ADMIN — ATORVASTATIN CALCIUM 80 MG: 80 TABLET, FILM COATED ORAL at 08:41

## 2024-06-14 RX ADMIN — PIPERACILLIN SODIUM AND TAZOBACTAM SODIUM 3.38 G: 3; .375 INJECTION, SOLUTION INTRAVENOUS at 04:01

## 2024-06-14 RX ADMIN — PIPERACILLIN SODIUM AND TAZOBACTAM SODIUM 3.38 G: 3; .375 INJECTION, SOLUTION INTRAVENOUS at 10:27

## 2024-06-14 RX ADMIN — VANCOMYCIN HYDROCHLORIDE 1500 MG: 1.5 INJECTION, POWDER, LYOPHILIZED, FOR SOLUTION INTRAVENOUS at 06:19

## 2024-06-14 ASSESSMENT — COGNITIVE AND FUNCTIONAL STATUS - GENERAL
DAILY ACTIVITIY SCORE: 24
MOBILITY SCORE: 24

## 2024-06-14 ASSESSMENT — PAIN SCALES - GENERAL: PAINLEVEL_OUTOF10: 0 - NO PAIN

## 2024-06-14 NOTE — PROGRESS NOTES
Saul Colon is a 59 y.o. male on day 5 of admission presenting with Chest wall abscess.    Subjective   Patient denies fever or chills. Just experiences pain with dressing changes; otherwise denies pain.  Repeat CT A/P yesterday ordered per ID. Findings indicative of decreasing fluid collection size of both abdominal/right pleural process; however, ongoing concern for RLL loculated fluid/empyema. Thoracic surgery has been consulted.    Dr. Segovia reviewed CT with radiologist today who is most concerned that this is an atypical infection that perhaps has been underlying for some time.  AFB/Fungal culture ordered today.       Objective     Physical Exam  Vitals reviewed.   Constitutional:       General: He is not in acute distress.     Appearance: He is normal weight. He is not ill-appearing or toxic-appearing.   HENT:      Head: Normocephalic.      Nose: Nose normal.      Mouth/Throat:      Mouth: Mucous membranes are moist.   Eyes:      General: No scleral icterus.  Cardiovascular:      Rate and Rhythm: Normal rate.      Pulses: Normal pulses.   Pulmonary:      Effort: Pulmonary effort is normal. No respiratory distress.   Abdominal:      General: There is no distension.      Palpations: Abdomen is soft.      Tenderness: There is no abdominal tenderness.   Skin:     General: Skin is warm.      Comments: Right flank abscess s/p I&D - 2cm area of opening - Packing changed at bedside.  Old packing with significant purulent drainage as well as draining onto cover sponge- very moist wound bed.  Induration improving, erythema resolved. Quite tender internally    Neurological:      Mental Status: He is alert and oriented to person, place, and time.   Psychiatric:         Mood and Affect: Mood normal.         Behavior: Behavior normal.         Last Recorded Vitals  Blood pressure 151/88, pulse 64, temperature 36 °C (96.8 °F), temperature source Temporal, resp. rate 18, height 1.829 m (6'), weight 101 kg (223 lb), SpO2  94%.  Intake/Output last 3 Shifts:  No intake/output data recorded.    Relevant Results  Results for orders placed or performed during the hospital encounter of 06/09/24 (from the past 24 hour(s))   POCT GLUCOSE   Result Value Ref Range    POCT Glucose 155 (H) 74 - 99 mg/dL   POCT GLUCOSE   Result Value Ref Range    POCT Glucose 185 (H) 74 - 99 mg/dL   POCT GLUCOSE   Result Value Ref Range    POCT Glucose 119 (H) 74 - 99 mg/dL   POCT GLUCOSE   Result Value Ref Range    POCT Glucose 107 (H) 74 - 99 mg/dL              Assessment/Plan   Active Problems:  There are no active Hospital Problems.    59 year old male with a history significant for HTN, HLD, NIDDM Type 2, prior appendectomy for perforated appendicitis (2013) c/b perihepatic abscess (2016- E.Coli), and urachal cyst remnant s/p open excision (2016) presented to [facility] ED from home on 6/10 with worsening redness and tenderness to right flank mass.       Impression:  Right flank abscess    Likely multi-loculated abscess related to previous perihepatic drain     Procedures:  6/10 - Bedside I&D right flank/chest wall abscess - Dr. Segovia      Recommendations:  - Follow up culture results     > No growth final    > Send AFB/Fungal culture today  - Packing changed today; for home going patient should change packing daily and cover with gauze. When pus stops draining, should moisten gauze with saline if able.  - Okay for diet  - Defer to ID for antibiotic management, but ideally de-escalate soon   --> Augmentin x 14 days per ID note  - consult vascular surgery for findings of SMA stenosis on CT  - Okay for DVT chemoprophylaxis   - Symptom management/remainder of care per medical team     Dispo: Okay for discharge from surgery perspective.    Outpatient follow up with Dr. Segovia in 1 week post discharge     The patient will be seen and discussed with the attending surgeon Dr. Segovia.    I spent 20 minutes in the professional and overall care of this  patient.      Shaye Euceda, APRN-CNP

## 2024-06-14 NOTE — CONSULTS
Consults  Vascular Surgery  Reason For Consult  SMA stenosis noted on CT Scan    History Of Present Illness  Saul Colon is a 59 y.o. male presenting with PMHx of HTN, HLD, DM II, and GERD presents with an enlarging R flank mass. He first noticed what appeared to be a small boil approximately one week ago. Over the past week it has grown in size, become tender, and erythematous. His PCP ordered an outpatient US which showed a 1.6 x 3.5 x 3.5cm solid hypoechoic soft tissue mass with recommendation for a follow up contrast enhanced CT. He was scheduled for an outpatient CT but due to rapid growth and tenderness he came to the ED. He denies fever, chills, and drainage from the mass. The ED discussed with general sx Dr. Segovia on call who reviewed CT images and recommended covering with broad spectrum IV abx and will discuss with IR for drainage/bx. He does have a history of perforated appendix complicated by perihepatic abscess (Ecoli and anaerobes) in 2016 that required SOCORRO drain placement and long term abx.  I evaluated this patient at bedside.  Patient denies any postprandial pain, persistent nausea and/or vomiting, constipation, diarrhea, hematochezia.     Surgical History  Colonoscopy, appendectomy, hernia repair, vasectomy     Social History  Denies tobacco, EtOH, and illicit     Family History  HTN, HLD     Allergies  Patient has no known allergies.     Review of Systems   Skin:         R flank mass    .     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), Other conditions influencing health status (08/22/2016), Other shoulder lesions, right shoulder (06/29/2016), Pain in right knee (04/04/2022), Personal  history of diseases of the blood and blood-forming organs and certain disorders involving the immune mechanism (04/17/2017), Personal history of diseases of the skin and subcutaneous tissue (08/16/2017), Personal history of other diseases of the digestive system, Personal history of other diseases of the musculoskeletal system and connective tissue (01/19/2015), Personal history of other diseases of the respiratory system (02/15/2016), Plantar fascial fibromatosis (12/12/2014), Postnasal drip (08/12/2016), Rash and other nonspecific skin eruption (11/08/2022), Umbilical hernia with obstruction, without gangrene (08/17/2016), Unspecified otitis externa, unspecified ear (09/21/2018), and Unspecified rotator cuff tear or rupture of right shoulder, not specified as traumatic (04/17/2017).    Surgical History  He has a past surgical history that includes Colonoscopy (08/02/2017); Vasectomy (08/02/2017); Other surgical history (08/02/2017); Hernia repair (01/08/2014); and Appendectomy (01/08/2014).     Social History  He reports that he has never smoked. He has never used smokeless tobacco. He reports current alcohol use of about 2.0 standard drinks of alcohol per week. He reports that he does not use drugs.    Family History  No family history on file.     Allergies  Patient has no known allergies.    Review of Systems A 10 point ROS was performed with the patient denying any complaint at this time aside from those listed in the HPI above.     Physical Exam  Constitutional: Well developed , awake/alert/oriented x3, in no distress,  Eyes: Clear sclera  ENMT: mucous membranes are moist, no apparent injury, no lesions seen,   Head/neck: Neck supple, trachea  is midline, no apparent injury, no bruits, no mass, no stridor  Respiratory/thorax: Breath sounds clear and equal bilaterally with good chest expansion, thorax symmetric  Cardiac/Vascular: Regular, rate and rhythm, no murmurs, 2+ radial pulses, palpable popliteals  bilaterally DP and PTs strong palpable pulses  Gastrointestinal: Nondistended soft nontender, positive bowel sounds, no bruits.    Musculoskeletal: Moves all extremities, limited range of motion , no joint swelling,   Extremities: No cyanosis, no contusions or wounds,   Neurological: Alert and oriented x3,   Lymphatic: No significant lymphadenopathy  Skin: Warm and dry, no lesions, no rashes  Psychological: Appropriate mood and behavior last Recorded Vitals  Blood pressure 151/88, pulse 64, temperature 36 °C (96.8 °F), temperature source Temporal, resp. rate 18, height 1.829 m (6'), weight 101 kg (223 lb), SpO2 94%.       Results for orders placed or performed during the hospital encounter of 06/09/24 (from the past 24 hour(s))   POCT GLUCOSE   Result Value Ref Range    POCT Glucose 155 (H) 74 - 99 mg/dL   POCT GLUCOSE   Result Value Ref Range    POCT Glucose 185 (H) 74 - 99 mg/dL   POCT GLUCOSE   Result Value Ref Range    POCT Glucose 119 (H) 74 - 99 mg/dL   POCT GLUCOSE   Result Value Ref Range    POCT Glucose 107 (H) 74 - 99 mg/dL       CT abdomen pelvis w IV contrast    Result Date: 6/13/2024  Interpreted By:  Jordy Marie, STUDY: CT ABDOMEN PELVIS W IV CONTRAST;  6/13/2024 4:14 pm   INDICATION: Signs/Symptoms:Abscess possibly extending to pleura.   COMPARISON: 06/09/2024, 10/26/2016   ACCESSION NUMBER(S): [id number]   ORDERING CLINICIAN: ASHWINI HERNDON   TECHNIQUE: CT of the abdomen and pelvis was performed.  Standard contiguous axial images were obtained at 3 mm slice thickness through the abdomen and pelvis. Coronal and sagittal reconstructions at 3 mm slice thickness were performed.   75 ml of Omnipaque 350 were administered intravenously without immediate complication.   FINDINGS: LOWER CHEST: 5 mm anterior right lung nodule image 37 slightly increased since 2016. 4 mm anterior right lung nodule image 18 also slightly more pronounced. Mild patchy bibasilar infiltrates/atelectasis. Focal consolidative  opacity at the right lung base up to 3.6 cm similar to prior. Approximate 2 cm hypoattenuating focus within this consolidative opacity image 41 could reflect sequela of infectious/inflammatory process including abscess although slightly decreased compared to prior. There is also pleural thickening and some probable loculated pleural fluid or empyema at the posterior right lung base also similar. Partially imaged coronary calcifications. Motion artifact otherwise limits evaluation particularly at the vascular structures at the lower lungs and lung bases. Mildly prominent nonspecific anterior pericardiophrenic nodes up to 7 mm short axis and there are some mildly prominent nonspecific nodes at the right lower anterior chest wall up to 6 mm.   ABDOMEN:   LIVER: Fatty liver.   BILE DUCTS: Normal caliber.   GALLBLADDER: No definite opaque gallstones.   PANCREAS: Within normal limits.   SPLEEN: Stable appearance.   ADRENAL GLANDS: Within normal limits.   KIDNEYS AND URETERS: No definite renal stones or hydronephrosis. Mild nonspecific perinephric stranding bilaterally.   PELVIS:   BLADDER: Mildly distended. Small apparent metallic density involving or abutting right lateral bladder wall within the pelvis may be sequela postoperative change and similar to prior.   REPRODUCTIVE ORGANS: Mildly enlarged prostate gland.   VESSELS: Moderate to markedatherosclerotic calcifications. Mild ectatic/aneurysmal dilatation infrarenal abdominal aorta up to 2.3 cm similar to prior. Focal apparent soft plaque at the origin of the SMA causing marked stenosis, image 57. Evaluation otherwise limited on this examination.   BOWEL: No definite bowel obstruction. Prominent colonic stool. Suggestion of postoperative change about expected location of appendix. Mild nonspecific thickened appearance at the distal colon/rectum.   PERITONEUM/RETROPERITONEUM/LYMPH NODES: No free air. Mildly prominent nonspecific marilyn hepatis/peripancreatic nodes up to  11 mm short axis similar to prior. Mildly prominent nonspecific mesenteric nodes scattered throughout also similar. Approximate 2.3 cm irregular spiculated nodular density with some associated calcification within the mid pelvis image 125 abuts portion of sigmoid colon coursing through this region, including with tract like components of sigmoid colon abutting the lesion, for example coronal image 53. However overall this is decreased in size compared to 2016 when it measured up to 3.5 cm. There is also approximate 2.8 cm tubular structure just beneath the midline anterior abdominal wall, image 109 near the region of the umbilicus which is similar to most recent prior however more extensive density at this location which appeared to communicate with the urinary bladder was present on prior study of 2016. This may be related to sequela of previously described urachal remnant or related surgical changes. Mildly prominent nonspecific inguinal nodes similar to prior.   ABDOMINAL WALL: Irregular heterogeneous masslike density in the right lateral abdominal wall at the level of the inferior liver measuring up to 4.3 x 2.5 cm with some mild internal heterogeneous hypoattenuation, slightly decreased from up to 4.7 x 3.1 cm on most recent prior. This is about the region of a percutaneous drainage catheter which was present on 10/26/2016. There is also thickening of the right lateral abdominal wall musculature more inferiorly up to at least 3.5 cm thickness and also smaller 1.5 cm hypoattenuating focus within the right lateral abdominal wall musculature near this region which is also slightly decreased compared to 2.3 cm previously. Coarse calcification in this region as well. There is some thickening tracking along the right posterior abdominal wall and retroperitoneum and abutting the posterior right hepatic lobe with some heterogeneity and also probably some calcification which appears to extend superiorly to the right  pleural at the lung base and probably communicates with aforementioned findings at the right lung base. Moderate fat containing inguinal hernias bilaterally. Small foci of gas right anterior abdominal wall may be sequela of subcutaneous medicine injection. Probable sequela postoperative changes midline anterior abdominal wall.   BONES No definite new suspicious osseous lesions are identified. Multilevel degenerative changes visualized spine. Moderate central canal stenosis L4-L5 similar to prior.       1.  Redemonstration heterogeneous masslike density/heterogeneous collection at the right lateral abdominal wall although slightly decreased in size compared to prior. There is also heterogeneous thickening of the right lateral abdominal wall musculature extending more inferiorly which is similar and additional smaller associated low-density focus collection/abscess within the right lateral abdominal wall musculature at this location remains but also decreased in size. Heterogeneous thickening/stranding extends from this region abutting posterior right hepatic lobe and extending to the posterior retroperitoneum and pleura at the right lung base with apparent pleural thickening and probable loculated fluid and/or empyema. Focal consolidative opacity at the right lung base remains which contains approximate 2 cm hypoattenuating focus concerning for abscess but also slightly decreased compared to prior. Overall findings felt most likely relating to sequela of infectious/inflammatory process with abscesses and abdominal wall/pleural fistula. Underlying malignant process cannot be entirely excluded and close follow-up to ensure improvement/resolution is recommended and tissue sampling may also be considered. Component of hematoma involving the right lateral abdominal musculature would also be possible. 2. Redemonstration of spiculated nodular density with some coarse calcification within the pelvis abutting portion of  sigmoid colon as described and could be related to sequela of infectious/inflammatory or neoplastic process including carcinoid or sigmoid tumor among others. However this has overall decreased compared to 2016 and therefore benign process would be favored. There is also mild nonspecific thickened appearance at the distal colon/rectum. Close clinical correlation and follow-up advised and correlation with colonoscopy may also be considered as a means of further assessment. 3. Tubular structure within the anterior abdomen just beneath the anterior abdominal wall about the region of the umbilicus of uncertain etiology and significance, could be sequela of previously described urachal remnant and/or associated surgical changes. 4. Mildly enlarged prostate gland. 5. Atherosclerotic vascular disease as above with mild ectatic/aneurysmal dilatation infrarenal abdominal aorta up to 2.3 cm similar to prior. Focal apparent soft plaque at the origin of the SMA causing apparent marked stenosis as described although evaluation otherwise limited on this examination. Correlation with dedicated CTA may be considered for further assessment as clinically warranted. 6. Subcentimeter lung nodules as described for which follow-up recommended. 7. Additional findings as above.     MACRO: None   Signed by: Jordy Marie 6/13/2024 6:37 PM Dictation workstation:   [id number]    CT transfer of outside films    Result Date: 6/12/2024  Outside images for comparison or treatment purposes, not interpreted by  Radiologists.    CT transfer of outside films    Result Date: 6/12/2024  Outside images for comparison or treatment purposes, not interpreted by  Radiologists.    CT transfer of outside films    Result Date: 6/12/2024  Outside images for comparison or treatment purposes, not interpreted by  Radiologists.    CT transfer of outside films    Result Date: 6/12/2024  Outside images for comparison or treatment purposes, not interpreted by   Radiologists.    CT abdomen pelvis w IV contrast    Result Date: 6/9/2024  STUDY: CT Abdomen and Pelvis with IV Contrast; 06/09/2024, 5:47 PM. INDICATION: Follow up for abnormal US; soft tissue mass on right trunk. COMPARISON: US abdomen: 06/01/24. ACCESSION NUMBER(S): [id number] ORDERING CLINICIAN: MICHELLE ANTONY TECHNIQUE: CT of the abdomen and pelvis was performed.  Contiguous axial images were obtained at 3 mm slice thickness through the abdomen and pelvis. Coronal and sagittal reconstructions at 3 mm slice thickness were performed.  Omnipaque 350 80 mL was administered intravenously.  FINDINGS: LOWER CHEST: No cardiomegaly.  No pericardial effusion.  Lung bases are clear.  ABDOMEN:  LIVER: Hepatic steatosis.  BILE DUCTS: No intrahepatic or extrahepatic biliary ductal dilatation.  GALLBLADDER: The gallbladder is normal. STOMACH: No abnormalities identified.  PANCREAS: No masses or ductal dilatation.  SPLEEN: No splenomegaly or focal splenic lesion.  ADRENAL GLANDS: No thickening or nodules.  KIDNEYS AND URETERS: Kidneys are normal in size and location.  No renal or ureteral calculi.  PELVIS:  BLADDER: No abnormalities identified.  REPRODUCTIVE ORGANS: No abnormalities identified.  BOWEL: No abnormalities identified.  VESSELS: No abnormalities identified.  Abdominal aorta is normal in caliber.  PERITONEUM/RETROPERITONEUM/LYMPH NODES: No free fluid.  No pneumoperitoneum. No lymphadenopathy.  ABDOMINAL WALL: Within the right basal pleural space there is a small collection measuring 5.8 x 1.4 cm axial image 44. Stranding is noted along the inferior margin of the pleura extending into the lateral margin of the retroperitoneum with a tiny fluid collection in the right abdominal wall musculature measuring 2.3 cm axial image 83 and a linear collection versus mass measuring 3.4 x 2.6 cm within the right lateral abdominal wall at the level of the inferior ribs axial image 71 probably visualized on prior ultrasound..  SOFT TISSUES: No abnormalities identified.  BONES: No acute fracture or aggressive osseous lesion.    Multiple small collections versus with likely mass lesions, at the right inferior margin of the pleural space extending into the right lateral peritoneal space and right upper quadrant abdominal wall. The second largest collection was probably previously visualized by ultrasound. Signed by John Chavez MD    US abdomen limited    Result Date: 6/3/2024  Interpreted By:  Anabel Mueller, STUDY: US ABDOMEN LIMITED; 5:47 pm   INDICATION: Signs/Symptoms:Evaluate lump overlying R lateral upper abdomen.   COMPARISON: None.   ACCESSION NUMBER(S): [id number]   ORDERING CLINICIAN: ELINOR MURRAY   TECHNIQUE: Grayscale sonography and color flow imaging at the site of clinical concern indicated by the patient is performed.   FINDINGS: A 1.6 x 3.5 x 3.5 cm hypoechoic soft tissue mass is seen at the site of palpable abnormality. There is edema of the subcutaneous fat overlying this palpable abnormality. There is some blood flow within this soft tissue mass.       1.6 x 3.5 x 3.5 cm solid hypoechoic soft tissue mass at the site of clinical concern along the right side of the upper abdomen with subcutaneous fat overlying this mass appearing edematous. I would recommend CT examination of abdomen with contrast administration for further characterization.   MACRO: None.   Signed by: Anabel Mueller 6/3/2024 6:03 PM Dictation workstation:   [id number]     Assessment/Plan    SMA stenosis   infrarenal abdominal aortic ectatic/aneurysm    I evaluated patient at bedside and discussed case with Dr. Presley.  Imaging evaluated patient has some degree of SMA stenosis asymptomatic at this time, patient denies any postprandial pain, food fear, nausea, vomiting, hematochezia, constipation, diarrhea.  Patient also has small ectatic infrarenal abdominal aortic.  Start/continue daily antiplatelet therapy (81 mg ASA), maximize statin, and  antihypertensive medication for superior mesenteric artery stenosis/infrarenal abdominal aortic ectatic area/aneurysm prophylaxis. There was a shared discussion with the patient to continue a lifestyle modification that promotes: The adherence to strict BP and glycemic control, healthy dietary habit changes, incorporation of daily exercise regimen, adherence to all prescription/OTC medication schedules, attendance to all follow-up appointments.  From a vascular surgery standpoint there are no objections for this patient to be discharged.    (This note was generated with voice recognition software and may contain errors including spelling, grammar, syntax and missed recognition of what was dictated, of which may not have been fully corrected) thank you very much for allowing Vascular Surgery to be involved in the care of your patient sincerely Watson LANGFORD .

## 2024-06-14 NOTE — PROGRESS NOTES
General surgery attending note:  I examined and evaluated the patient.  I discussed the patient with the RUTHIE and reviewed the RUTHIE note.    Subjective   The patient has no complaints regarding the right chest wall abscess.  Pain is much improved.  Packing and dressing were changed by surgery RUTHIE    Objective     Physical Exam  Right chest wall abscess still has purulent drainage, but otherwise is much improved.  Erythema and swelling and tenderness are better.    Last Recorded Vitals  Blood pressure 151/88, pulse 64, temperature 36 °C (96.8 °F), temperature source Temporal, resp. rate 18, height 1.829 m (6'), weight 101 kg (223 lb), SpO2 94%.  Intake/Output last 3 Shifts:  No intake/output data recorded.    Relevant Results  Right chest wall abscess culture showed no growth.    I reviewed the CT abdomen/pelvis report and images.  I reviewed the images with the radiologist.  I also reviewed the images from his CT scans in 2016.  IMPRESSION:  1.  Redemonstration heterogeneous masslike density/heterogeneous  collection at the right lateral abdominal wall although slightly  decreased in size compared to prior. There is also heterogeneous  thickening of the right lateral abdominal wall musculature extending  more inferiorly which is similar and additional smaller associated  low-density focus collection/abscess within the right lateral  abdominal wall musculature at this location remains but also  decreased in size. Heterogeneous thickening/stranding extends from  this region abutting posterior right hepatic lobe and extending to  the posterior retroperitoneum and pleura at the right lung base with  apparent pleural thickening and probable loculated fluid and/or  empyema. Focal consolidative opacity at the right lung base remains  which contains approximate 2 cm hypoattenuating focus concerning for  abscess but also slightly decreased compared to prior. Overall  findings felt most likely relating to sequela  of  infectious/inflammatory process with abscesses and abdominal  wall/pleural fistula. Underlying malignant process cannot be entirely  excluded and close follow-up to ensure improvement/resolution is  recommended and tissue sampling may also be considered. Component of  hematoma involving the right lateral abdominal musculature would also  be possible.  2. Redemonstration of spiculated nodular density with some coarse  calcification within the pelvis abutting portion of sigmoid colon as  described and could be related to sequela of infectious/inflammatory  or neoplastic process including carcinoid or sigmoid tumor among  others. However this has overall decreased compared to 2016 and  therefore benign process would be favored. There is also mild  nonspecific thickened appearance at the distal colon/rectum. Close  clinical correlation and follow-up advised and correlation with  colonoscopy may also be considered as a means of further assessment.  3. Tubular structure within the anterior abdomen just beneath the  anterior abdominal wall about the region of the umbilicus of  uncertain etiology and significance, could be sequela of previously  described urachal remnant and/or associated surgical changes.  4. Mildly enlarged prostate gland.  5. Atherosclerotic vascular disease as above with mild  ectatic/aneurysmal dilatation infrarenal abdominal aorta up to 2.3 cm  similar to prior. Focal apparent soft plaque at the origin of the SMA  causing apparent marked stenosis as described although evaluation  otherwise limited on this examination. Correlation with dedicated CTA  may be considered for further assessment as clinically warranted.  6. Subcentimeter lung nodules as described for which follow-up  recommended.  7. Additional findings as above.          Assessment/Plan   Post procedure day #4 following incision and drainage of right chest wall abscess.  Symptoms much improved.  1.  Right chest wall abscess with  extension into wall of abdomen and concern for involvement of pleura.    No growth on bacterial cultures.  Order fungal and AFB culture on fluid.  Continue antibiotics per infectious disease service.  Thoracic surgery has been consulted regarding the possible pleural involvement.  Radiologist feels that the abscess is most likely inflammatory, but is concerned about the possibility of malignancy.  Order core biopsy of the mass by interventional radiology.  2.  Density adjacent to the sigmoid colon which is significantly smaller than on previous CT scan in 2016.  Given the fact that it is shrinking in size over a period of 8 years, it is highly unlikely that this is malignant.  The radiologist recommended colonoscopy which he already has scheduled in July.  3.  SMA stenosis on CT scan.  Consult vascular surgery for evaluation.  4.  Lung nodules noted on CT scan per internal medicine service.        Mook Segovia MD

## 2024-06-14 NOTE — PROGRESS NOTES
"Infectious disease progress note  Subjective   Right flank abscess    Antibiotics  Vancomycin, Zosyn day 5  Objective   Range of Vitals (last 24 hours)  Heart Rate:  [60-96]   Temp:  [35.3 °C (95.5 °F)-36 °C (96.8 °F)]   Resp:  [18]   BP: (112-151)/(59-88)   SpO2:  [94 %-97 %]   Daily Weight  06/09/24 : 101 kg (223 lb)    Body mass index is 30.24 kg/m².      Physical Exam  Patient up walking around somewhere nurse trying to find the patient      Relevant Results  Labs  Lab Results   Component Value Date    WBC 8.7 06/13/2024    HGB 13.8 06/13/2024    HCT 41.8 06/13/2024    MCV 90 06/13/2024     06/13/2024     Lab Results   Component Value Date    GLUCOSE 109 (H) 06/13/2024    CALCIUM 9.5 06/13/2024     06/13/2024    K 4.5 06/13/2024    CO2 26 06/13/2024     06/13/2024    BUN 12 06/13/2024    CREATININE 0.88 06/13/2024   ESR: --No results found for: \"SEDRATE\"No results found for: \"CRP\"  Lab Results   Component Value Date    ALT 27 06/09/2024    AST 18 06/09/2024    ALKPHOS 86 06/09/2024    BILITOT 0.4 06/09/2024       Microbiology  6-9-2024 blood cultures no growth 2 days  6- wound cultures no growth   Imaging  6-9-2024 CT abdomen pelvis shows multiple small collections versus likely mass lesions at the right inferior margin of the pleural space extending to the right lateral peritoneal space and right upper quadrant abdominal wall     Assessment/Plan   1.  Will continue IV antibiotics for now.  If patient goes for biopsy today and gets discharged home would give him Augmentin to complete a 14-day total treatment course  2.  Went over CT carefully and also discussed case with primary care physician.  Surgery will be following patient closely as an outpatient    Other issues  Diabetes mellitus puts patient at high risk for infections  Hyperlipidemia  Hypertension    I reviewed and interpreted all lab test imaging studies and documentations from other healthcare providers  I am monitoring for " antibiotic side effects and toxicity     Ro Madison MD

## 2024-06-14 NOTE — PROGRESS NOTES
Vancomycin Dosing by Pharmacy- FOLLOW UP    Saul Colon is a 59 y.o. year old male who Pharmacy has been consulted for vancomycin dosing for intra-abdominal infection; R flank abscess. Based on the patient's indication and renal status this patient is being dosed based on a goal AUC of 400-600.     Renal function is currently stable.    Current vancomycin dose: 1500 mg given every 12 hours    Visit Vitals  /88   Pulse 64   Temp 36 °C (96.8 °F)   Resp 18        Lab Results   Component Value Date    CREATININE 0.88 06/13/2024    CREATININE 0.97 06/11/2024    CREATININE 0.90 06/10/2024    CREATININE 0.93 06/09/2024        No results found for the encounter in last 14 days.      No intake/output data recorded.      Assessment/Plan    Day #5 of vancomycin therapy.   Predicted AUC is 495 mg/L.hr and remains within goal range.   Level was originally reordered for today. This is not necessary since level was drawn at least 1 hour after the dose finished infusing and patient's renal function is stable. Will reorder the level to be drawn on 6/15 with PM labs (~1400).   Will continue to monitor renal function daily while on vancomycin and order serum creatinine at least every 48 hours if not already ordered.  Will follow for continued vancomycin needs, clinical response, and signs/symptoms of toxicity.     Please call with any questions.  Krystina Aponte, PharmD, BCCCP  [id number]

## 2024-06-14 NOTE — CARE PLAN
The patient's goals for the shift include      The clinical goals for the shift include vital signs stable    Over the shift, the patient did not make progress toward the following goals. Barriers to progression include ***. Recommendations to address these barriers include ***.

## 2024-06-14 NOTE — DISCHARGE INSTRUCTIONS
"Wound care:  Pack wound with 1/2\" gauze daily and cover with Gauze bandage and paper tape.  If the old dressing every day is still looking like very moist pus, do not wet the gauze strip.  Once the daily dressing starts to dry out or if wound starts bleeding with dressing changes, recommend lightly moistening the 1/2\" gauze with sterile saline flush.  Do not saturate the dressing or have wound overly-wet.     Okay to take bandage off, shower, wash over top of the wound, then allow to air dry before re-packing.   "

## 2024-06-14 NOTE — CARE PLAN
The patient's goals for the shift include      The clinical goals for the shift include vital signs stable      Problem: Pain  Goal: My pain/discomfort is manageable  Outcome: Progressing     Problem: Safety  Goal: Patient will be injury free during hospitalization  Outcome: Progressing  Goal: I will remain free of falls  Outcome: Progressing     Problem: Daily Care  Goal: Daily care needs are met  Outcome: Progressing     Problem: Psychosocial Needs  Goal: Demonstrates ability to cope with hospitalization/illness  Outcome: Progressing  Goal: Collaborate with me, my family, and caregiver to identify my specific goals  Outcome: Progressing     Problem: Discharge Barriers  Goal: My discharge needs are met  Outcome: Progressing     Problem: Pain  Goal: Takes deep breaths with improved pain control throughout the shift  Outcome: Progressing  Goal: Turns in bed with improved pain control throughout the shift  Outcome: Progressing  Goal: Walks with improved pain control throughout the shift  Outcome: Progressing  Goal: Performs ADL's with improved pain control throughout shift  Outcome: Progressing  Goal: Participates in PT with improved pain control throughout the shift  Outcome: Progressing  Goal: Free from opioid side effects throughout the shift  Outcome: Progressing  Goal: Free from acute confusion related to pain meds throughout the shift  Outcome: Progressing

## 2024-06-14 NOTE — DISCHARGE SUMMARY
Discharge Diagnosis  Right chest/flank wall abscess s/p drainage on 6/10 , s/p vanc, zosyn and Augmentin, total of 14 days of antibiotics.  Lung nodules  Nodular density with some coarse calcification within the pelvis abutting portion of sigmoid colon.  Diabetes type 2  Hypertension  Dyslipidemia  Atherosclerotic vascular disease,aneurysmal dilation abdominal aorta up to 2.3 cm.   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       aspirin 81 mg EC tablet      Take 1 tablet (81 mg) by mouth once daily.              CONTINUE taking these medications        Instructions Last Dose Given Next Dose Due   atorvastatin 80 mg tablet  Commonly known as: Lipitor           FreeStyle Lite Strips strip  Generic drug: blood sugar diagnostic      USE TO CHECK BLOOD SUGAR ONCE DAILY       lisinopril 20 mg tablet           metFORMIN 500 mg tablet  Commonly known as: Glucophage                     Where to Get Your Medications        These medications were sent to [place] [id number] [facility]  [address] [address]      Phone: [phone]   amoxicillin-pot clavulanate 875-125 mg tablet  aspirin 81 mg EC tablet         Test Results Pending At Discharge  Pending Labs       Order Current Status    AFB Culture/Smear In process    Fungal Culture/Smear In process            Relevant results    CT abdomen pelvis w IV contrast  1.  Redemonstration heterogeneous masslike density/heterogeneous collection at the right lateral abdominal wall although slightly decreased in size compared to prior. There is also heterogeneous thickening of the right lateral abdominal wall musculature extending  more inferiorly which is similar and additional smaller associated low-density focus collection/abscess within the right lateral abdominal wall musculature at this location remains but also decreased in size. Heterogeneous thickening/stranding extends from this region abutting posterior right hepatic lobe and extending to the posterior retroperitoneum and pleura at the right lung base with apparent pleural thickening and probable loculated fluid and/or empyema. Focal consolidative opacity at the right lung base remains which contains approximate 2 cm hypoattenuating focus concerning for abscess but also slightly decreased compared to prior. Overall findings felt most likely relating to sequela of infectious/inflammatory process with abscesses and abdominal wall/pleural fistula. Underlying malignant process cannot be entirely excluded and close follow-up to ensure improvement/resolution is recommended and tissue sampling may also be considered. Component of hematoma involving the right lateral abdominal musculature would also be possible.    2. Redemonstration of spiculated nodular density with some coarse calcification within the pelvis abutting portion of sigmoid colon as described and could be related to sequela of infectious/inflammatory or neoplastic process including carcinoid or sigmoid tumor among others. However this has overall decreased compared to 2016 and therefore benign process would be favored. There is also mild nonspecific thickened appearance at the distal colon/rectum. Close clinical correlation and follow-up advised and correlation with colonoscopy may also be considered as a means of further assessment.    3. Tubular structure within the anterior abdomen just beneath the anterior abdominal wall about the region of the umbilicus of uncertain etiology and significance, could be sequela of previously described urachal remnant and/or associated surgical changes.    4. Mildly enlarged prostate  gland.    5. Atherosclerotic vascular disease as above with mild ectatic/aneurysmal dilatation infrarenal abdominal aorta up to 2.3 cm  similar to prior. Focal apparent soft plaque at the origin of the SMA causing apparent marked stenosis as described although evaluation otherwise limited on this examination. Correlation with dedicated CTA may be considered for further assessment as clinically warranted.    6. Subcentimeter lung nodules as described for which follow-up recommended.    MACRO:  None      Signed by: Jordy Marie 6/13/2024 6:37 PM  Dictation workstation:   [id number]    Hospital Course  This is a 59-year-old male with underlying conditions listed above who presented to the ED on 6/9/2024 due to right chest/flank abscess.  His PCP order an outpatient ultrasound which showed a 1.6 x 1.5 x 1.5 cm solid hypoechoic mass with recommendation for follow-up with CT with contrast.  Due to worsening he came to the ED.  CT imaging showed multiple small collections at right inferior margin of the pleural space extending into the right lateral peritoneal space and right upper quadrant abdomen wall.  Consulted general surgery, I&D on 6/10/2024 bedside.  Consulted infectious disease while inpatient using Zosyn and vancomycin.  Will complete antibiotics outpatient with Augmentin.  Wound culture showed no growth to date, ordered additional  AFB culture and fungal culture.  Repeat CT AP with contrast with changes as above.  Placed a referral for interventional radiology for US soft tissue guided biopsy.  Seen by vascular surgery, added aspirin as a home med.  Seen by thoracic surgery, no further workup needed while inpatient.  Patient is stable to be discharged and continue follow-up as an outpatient as listed below.    Issues Requiring Follow-Up  -General surgery 1 week, Dr. Segovia.   -Interventional radiology for US guided soft tissue biopsy (right sided-see CT report), placed referral,  from IR will call  patient.   -Lung nodule clinic due to lung nodules.  -GI for colonoscopy (sigmoid changes- see CT report), has an appointment for July.  -Vascular surgery due to atherosclerotic vascular disease, aneurysmal dilation abdominal aorta up to 2.3 cm.       Pertinent Physical Exam At Time of Discharge  Physical Exam  General: alert and oriented. No acute distress.  HEENT: oral mucosa moist, EOMI.  CHEST: clear breath sounds, no crackles, no wheeze, no tachypnea.  CVS: regular rate and rhythm, no murmurs.   ABD: Soft, Non Tender, BS present.  Rectus muscle diastasis.   EXT: no edema.  SKIN: no rash.  Right chest/flank-inferior wall  with dressing s/p I&D.   NEURO: Nonfocal grossly.       Outpatient Follow-Up  Future Appointments   Date Time Provider Department Center   7/11/2024  2:10 PM Gerald Taylor MD [facility] [place]         Gris Ibanez MD

## 2024-06-18 ENCOUNTER — TELEMEDICINE (OUTPATIENT)
Dept: PRIMARY CARE | Facility: CLINIC | Age: 59
End: 2024-06-18
Payer: COMMERCIAL

## 2024-06-18 DIAGNOSIS — R91.1 LUNG NODULE: ICD-10-CM

## 2024-06-18 DIAGNOSIS — R91.1 PULMONARY NODULE 1 CM OR GREATER IN DIAMETER: Primary | ICD-10-CM

## 2024-06-18 DIAGNOSIS — R91.8 MULTIPLE LUNG NODULES: ICD-10-CM

## 2024-06-18 PROBLEM — H60.90 OTITIS EXTERNA: Status: ACTIVE | Noted: 2024-06-18

## 2024-06-18 PROBLEM — M25.569 KNEE PAIN: Status: ACTIVE | Noted: 2024-06-18

## 2024-06-18 PROBLEM — N52.9 IMPOTENCE OF ORGANIC ORIGIN: Status: ACTIVE | Noted: 2024-06-18

## 2024-06-18 PROBLEM — K21.9 GASTROESOPHAGEAL REFLUX DISEASE: Status: ACTIVE | Noted: 2024-06-18

## 2024-06-18 PROBLEM — D64.9 ANEMIA: Status: ACTIVE | Noted: 2024-06-18

## 2024-06-18 PROBLEM — H90.3 SENSORINEURAL HEARING LOSS (SNHL) OF BOTH EARS: Status: ACTIVE | Noted: 2024-06-18

## 2024-06-18 PROBLEM — M75.100 ROTATOR CUFF SYNDROME: Status: ACTIVE | Noted: 2024-06-18

## 2024-06-18 PROBLEM — R21 RASH: Status: ACTIVE | Noted: 2024-06-18

## 2024-06-18 PROBLEM — K65.9 INFECTION IN ABDOMEN (MULTI): Status: ACTIVE | Noted: 2024-06-18

## 2024-06-18 SDOH — ECONOMIC STABILITY: FOOD INSECURITY: WITHIN THE PAST 12 MONTHS, YOU WORRIED THAT YOUR FOOD WOULD RUN OUT BEFORE YOU GOT MONEY TO BUY MORE.: NEVER TRUE

## 2024-06-18 SDOH — ECONOMIC STABILITY: FOOD INSECURITY: WITHIN THE PAST 12 MONTHS, THE FOOD YOU BOUGHT JUST DIDN'T LAST AND YOU DIDN'T HAVE MONEY TO GET MORE.: NEVER TRUE

## 2024-06-18 ASSESSMENT — ENCOUNTER SYMPTOMS
OCCASIONAL FEELINGS OF UNSTEADINESS: 0
DEPRESSION: 0
LOSS OF SENSATION IN FEET: 0

## 2024-06-18 ASSESSMENT — PAIN SCALES - GENERAL: PAINLEVEL: 0-NO PAIN

## 2024-06-18 NOTE — H&P (VIEW-ONLY)
Subjective   Patient ID: Saul Colon is a 59 y.o. male who presents for New Patient Visit (Saul Colon has a new visit regarding a lung nodule.  Never a tobacco user. No personal history of cancer. Father had lung cancer./).  HPI 59-year-old male presents today for lung nodule clinic    Telephone visit between Saul Colon and Sheron Ferrell CNP at [facility] lung nodule clinic per patient request    Never a tobacco user. No personal history of cancer. Father had lung cancer.    CT abdomen and pelvis with IV contrast dated 6/13/2024  5 mm anterior right lung nodule image 37 slightly increased since  2016. 4 mm anterior right lung nodule image 18 also slightly more  pronounced. Mild patchy bibasilar infiltrates/atelectasis. Focal  consolidative opacity at the right lung base up to 3.6 cm similar to  prior. Approximate 2 cm hypoattenuating focus within this  consolidative opacity image 41 could reflect sequela of  infectious/inflammatory process including abscess although slightly  decreased compared to prior. There is also pleural thickening and  some probable loculated pleural fluid or empyema at the posterior  right lung base also similar. Partially imaged coronary  calcifications. Motion artifact otherwise limits evaluation  particularly at the vascular structures at the lower lungs and lung  bases. Mildly prominent nonspecific anterior pericardiophrenic nodes  up to 7 mm short axis and there are some mildly prominent nonspecific  nodes at the right lower anterior chest wall up to 6 mm.    Review of Systems  Review of systems: Present-feeling well. Not present-chills, fatigue and fever.  Respiratory: Not present-difficulty breathing, cough, bloody sputum.  Cardiovascular: Not present-chest pain, palpitations, dyspnea on exertion.  Objective   There were no vitals taken for this visit.   Assessment/Plan   Diagnoses and all orders for this visit:  Pulmonary nodule 1 cm or greater in diameter  -      CT chest wo IV contrast; Future  -     Referral to Thoracic Surgery; Future  Lung nodule  -     Referral to Lung Nodule Center  -     CT chest wo IV contrast; Future  Multiple lung nodules  -     Referral to Thoracic Surgery; Future

## 2024-06-18 NOTE — PATIENT INSTRUCTIONS
5 mm right anterior lung nodule slightly increased since 2016.  4 mm nodule slightly more prominent.  Mild patchy bibasilar infiltrates.  Focal consolidative opacity right lung base 3.6 cm.  Approximate 2 cm focus within the consolidative opacity.  Pleural thickening and some probable loculated pleural fluid or empyemia posterior right lung base.  Recommend CT chest.  Patient will be notified of results as they become available.  Referred to Dr. Hawthorne for further evaluation      Lung Nodule Clinic    [facility], [address]  [place], [place] [address]  Phone [phone]  Fax [phone]  Nurse Coordinator [phone]                                          Welcome to the [facility] Lung Nodule Clinic    Today was the initial consult with the lung nodule clinic to determine proper recommendations for follow up. Your care is coordinated to ensure timely management.  As you know, early detection of cancer is very important.  Nodules that are large, look suspicious or have changed over time is why further evaluation such as the additional imaging test that we have ordered is needed. Our clinic will work closely with you in choosing the best next step.       What is my next step?  We will assist with scheduling scans, results reviews, and referrals for priority appointments.      Who do I call?  Your care coordinator for the lung nodule clinic can be contacted at [phone]  All scheduling needs can be assisted within the Cardiac Surgery/Thoracic Surgery/Lung Nodule Clinic offices at [phone].              Table  Amy TALBOT, Phillip DP, Gdowin BO, et al. Guidelines for Management of Incidental Pulmonary Nodules Detected on CT Images: From the Fleischner Society 2017. Radiology 2017;284:228-243.

## 2024-06-18 NOTE — PROGRESS NOTES
Subjective   Patient ID: Saul Colon is a 59 y.o. male who presents for New Patient Visit (Saul Colon has a new visit regarding a lung nodule.  Never a tobacco user. No personal history of cancer. Father had lung cancer./).  HPI 59-year-old male presents today for lung nodule clinic    Telephone visit between Saul Colon and Sheron Ferrell CNP at [facility] lung nodule clinic per patient request    Never a tobacco user. No personal history of cancer. Father had lung cancer.    CT abdomen and pelvis with IV contrast dated 6/13/2024  5 mm anterior right lung nodule image 37 slightly increased since  2016. 4 mm anterior right lung nodule image 18 also slightly more  pronounced. Mild patchy bibasilar infiltrates/atelectasis. Focal  consolidative opacity at the right lung base up to 3.6 cm similar to  prior. Approximate 2 cm hypoattenuating focus within this  consolidative opacity image 41 could reflect sequela of  infectious/inflammatory process including abscess although slightly  decreased compared to prior. There is also pleural thickening and  some probable loculated pleural fluid or empyema at the posterior  right lung base also similar. Partially imaged coronary  calcifications. Motion artifact otherwise limits evaluation  particularly at the vascular structures at the lower lungs and lung  bases. Mildly prominent nonspecific anterior pericardiophrenic nodes  up to 7 mm short axis and there are some mildly prominent nonspecific  nodes at the right lower anterior chest wall up to 6 mm.    Review of Systems  Review of systems: Present-feeling well. Not present-chills, fatigue and fever.  Respiratory: Not present-difficulty breathing, cough, bloody sputum.  Cardiovascular: Not present-chest pain, palpitations, dyspnea on exertion.  Objective   There were no vitals taken for this visit.   Assessment/Plan   Diagnoses and all orders for this visit:  Pulmonary nodule 1 cm or greater in diameter  -      CT chest wo IV contrast; Future  -     Referral to Thoracic Surgery; Future  Lung nodule  -     Referral to Lung Nodule Center  -     CT chest wo IV contrast; Future  Multiple lung nodules  -     Referral to Thoracic Surgery; Future

## 2024-06-18 NOTE — PROGRESS NOTES
History Of Present Illness  HPI   The patient is a 59-year-old male who on Mona 10, 2024 had incision and drainage of a right inferior lateral chest wall abscess with local anesthetic at the bedside.  On CT scan the abscess appeared to extend through the right inferior lateral chest wall into the right lateral peritoneal space and pleural space.  The area improved following incision and drainage.  He was treated with IV antibiotics per infectious disease service recommendations.  He was discharged home on June 14, 2024 with Augmentin to complete a 14-day course of antibiotics.  This abscess is in the same location as the perihepatic abscess which was percutaneously drained in 2016.  The site of the incision and drainage was adjacent to the scar from his drain placement.  He was scheduled to have interventional radiology percutaneously biopsy the soft tissue in the region of the chest wall abscess in order to rule out neoplasm.  Culture from the abscess had no growth.  Fungal culture has been negative to date.  AFB culture was discontinued.  The patient reports that he feels much better.  The pain is much improved.  He still has tan fluid draining from the wound.  No fevers or chills.  He continues on antibiotics per infectious disease service.  He is scheduled for a CT chest on July 2 followed by evaluation by Dr. Hawthorne from thoracic surgery.  Biopsy by interventional radiology is scheduled July 8.  Colonoscopy is scheduled July 11.  Vascular surgery, Dr. Gotti, evaluated the patient and placed him on aspirin due to mesenteric vessel stenosis.    Past medical history:  Laparoscopic appendectomy in 2013 for a perforated appendix at [facility].  Perihepatic abscess percutaneously drained in October 2016.  Laparotomy with excision of urachal cyst at [facility] in November 2016.  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), Other conditions influencing health status (08/22/2016), Other shoulder lesions, right shoulder (06/29/2016), Pain in right knee (04/04/2022), Personal history of diseases of the blood and blood-forming organs and certain disorders involving the immune mechanism (04/17/2017), Personal history of diseases of the skin and subcutaneous tissue (08/16/2017), Personal history of other diseases of the digestive system, Personal history of other diseases of the musculoskeletal system and connective tissue (01/19/2015), Personal history of other diseases of the respiratory system (02/15/2016), Plantar fascial fibromatosis (12/12/2014), Postnasal drip (08/12/2016), Rash and other nonspecific skin eruption (11/08/2022), Umbilical hernia with obstruction, without gangrene (08/17/2016), Unspecified otitis externa, unspecified ear (09/21/2018), and Unspecified rotator cuff tear or rupture of right shoulder, not specified as traumatic (04/17/2017).    Surgical History  He has a past surgical history that includes Colonoscopy (08/02/2017); Vasectomy (08/02/2017); Other surgical history (08/02/2017); Hernia repair (01/08/2014); and Appendectomy (01/08/2014).     Allergies  Patient has no known allergies.    Social History  He reports that he has never smoked. He has never used smokeless tobacco. He reports current alcohol use of about 2.0 standard drinks of alcohol per week. He reports that he does not use drugs.    Family History  Family History   Problem Relation Name Age of Onset    Diabetes Mother      Other (abestos exposure) Father      Lung cancer Father           Last Recorded Vitals  Blood pressure  133/76, pulse 88, temperature 36.7 °C (98 °F), temperature source Temporal, resp. rate 16, height 1.829 m (6'), weight 101 kg (223 lb), SpO2 97%.    Physical Exam  Constitutional: Well-developed, well-nourished, alert and oriented, no acute distress  Skin: Warm and dry, no lesions, no rashes, no jaundice  HEENT: Normocephalic, atraumatic, EOMI, no scleral icterus, eyes have no redness or swelling or discharge, external inspection of ears and nose is normal, mucous membranes moist  Neck: Soft, nontender, no mass or adenopathy  Cardiac: Regular rate and rhythm, no murmur  Chest: Patent airway, clear to auscultation, normal breath sounds with good chest expansion, no wheezes or rales or rhonchi noted, thorax symmetric  Right inferior lateral chest wall 1.5 cm diameter skin opening with purulent drainage.  No surrounding swelling or erythema or tenderness.  Abdomen: Nondistended, positive bowel sounds, soft, nontender, no mass  Rectal: Not performed  Extremities: No injury, no lower extremity edema or calf tenderness  Lymphatic: No cervical adenopathy  Musculoskeletal: Range of motion intact, no joint swelling, normal strength  Neurological: Alert and oriented x3, intact sensory and motor function, no obvious focal neurologic abnormalities, normal gait  Psychological: Appropriate mood and behavior    Relevant Results  No growth from bacterial or fungal culture.  AFB could not be performed.  I discussed the recent CT abdomen results with a radiologist.    Assessment/Plan   Diagnoses and all orders for this visit:  Abdominal wall abscess  -     CT abdomen wo IV contrast; Future  Chest wall abscess  Uncertain etiology for recurrent abscess.  Cultures have no growth so far.  Patient much improved.  Wound recultured for bacteria, fungal and AFB.  Patient will need repeat CT scan to document resolution of the abscess.  Ordered to be done at the time of his CT chest.  Follow-up after IR biopsy completed.    Mook Segovia,  MD

## 2024-06-19 ENCOUNTER — OFFICE VISIT (OUTPATIENT)
Dept: WOUND CARE | Facility: CLINIC | Age: 59
End: 2024-06-19
Payer: COMMERCIAL

## 2024-06-19 LAB
FUNGUS SPEC CULT: NORMAL
FUNGUS SPEC FUNGUS STN: NORMAL

## 2024-06-19 PROCEDURE — 99214 OFFICE O/P EST MOD 30 MIN: CPT

## 2024-06-21 ENCOUNTER — OFFICE VISIT (OUTPATIENT)
Dept: SURGERY | Facility: CLINIC | Age: 59
End: 2024-06-21
Payer: COMMERCIAL

## 2024-06-21 VITALS
HEART RATE: 88 BPM | BODY MASS INDEX: 30.2 KG/M2 | HEIGHT: 72 IN | TEMPERATURE: 98 F | WEIGHT: 223 LBS | RESPIRATION RATE: 16 BRPM | DIASTOLIC BLOOD PRESSURE: 76 MMHG | OXYGEN SATURATION: 97 % | SYSTOLIC BLOOD PRESSURE: 133 MMHG

## 2024-06-21 DIAGNOSIS — L02.213 CHEST WALL ABSCESS: ICD-10-CM

## 2024-06-21 DIAGNOSIS — L02.211 ABDOMINAL WALL ABSCESS: Primary | ICD-10-CM

## 2024-06-21 LAB — HOLD SPECIMEN: NORMAL

## 2024-06-21 PROCEDURE — 87102 FUNGUS ISOLATION CULTURE: CPT

## 2024-06-21 PROCEDURE — 3044F HG A1C LEVEL LT 7.0%: CPT | Performed by: SURGERY

## 2024-06-21 PROCEDURE — 3078F DIAST BP <80 MM HG: CPT | Performed by: SURGERY

## 2024-06-21 PROCEDURE — 99215 OFFICE O/P EST HI 40 MIN: CPT | Performed by: SURGERY

## 2024-06-21 PROCEDURE — 87075 CULTR BACTERIA EXCEPT BLOOD: CPT

## 2024-06-21 PROCEDURE — 4010F ACE/ARB THERAPY RXD/TAKEN: CPT | Performed by: SURGERY

## 2024-06-21 PROCEDURE — 3075F SYST BP GE 130 - 139MM HG: CPT | Performed by: SURGERY

## 2024-06-21 PROCEDURE — 1036F TOBACCO NON-USER: CPT | Performed by: SURGERY

## 2024-06-21 PROCEDURE — 87205 SMEAR GRAM STAIN: CPT

## 2024-06-21 PROCEDURE — 87070 CULTURE OTHR SPECIMN AEROBIC: CPT

## 2024-06-21 NOTE — LETTER
June 21, 2024     Josse Al MD  [address]  [facility] [address]    Patient: Saul Colon   YOB: 1965   Date of Visit: 6/21/2024       Dear Dr. Josse Al MD:    Thank you for referring Saul Colon to me for evaluation. Below are my notes for this consultation.  If you have questions, please do not hesitate to call me. I look forward to following your patient along with you.       Sincerely,     Mook Segovia MD      CC: No Recipients  ______________________________________________________________________________________    History Of Present Illness  HPI   The patient is a 59-year-old male who on Mona 10, 2024 had incision and drainage of a right inferior lateral chest wall abscess with local anesthetic at the bedside.  On CT scan the abscess appeared to extend through the right inferior lateral chest wall into the right lateral peritoneal space and pleural space.  The area improved following incision and drainage.  He was treated with IV antibiotics per infectious disease service recommendations.  He was discharged home on June 14, 2024 with Augmentin to complete a 14-day course of antibiotics.  This abscess is in the same location as the perihepatic abscess which was percutaneously drained in 2016.  The site of the incision and drainage was adjacent to the scar from his drain placement.  He was scheduled to have interventional radiology percutaneously biopsy the soft tissue in the region of the chest wall abscess in order to rule out neoplasm.  Culture from the abscess had no growth.  Fungal culture has been negative to date.  AFB culture was discontinued.  The patient reports that he feels much better.  The pain is much improved.  He still has tan fluid draining from the wound.  No fevers or chills.  He continues on antibiotics per infectious disease service.  He is scheduled for a CT chest on July 2 followed by evaluation by Dr. Hawthorne from  thoracic surgery.  Biopsy by interventional radiology is scheduled July 8.  Colonoscopy is scheduled July 11.  Vascular surgery, Dr. Gotti, evaluated the patient and placed him on aspirin due to mesenteric vessel stenosis.    Past medical history:  Laparoscopic appendectomy in 2013 for a perforated appendix at [facility].  Perihepatic abscess percutaneously drained in October 2016.  Laparotomy with excision of urachal cyst at [facility] in November 2016.  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), Other conditions influencing health status (08/22/2016), Other shoulder lesions, right shoulder (06/29/2016), Pain in right knee (04/04/2022), Personal history of diseases of the blood and blood-forming organs and certain disorders involving the immune mechanism (04/17/2017), Personal history of diseases of the skin and subcutaneous tissue (08/16/2017), Personal history of other diseases of the digestive system, Personal history of other diseases of the musculoskeletal system and connective tissue (01/19/2015), Personal history of other diseases of the respiratory system (02/15/2016), Plantar fascial fibromatosis (12/12/2014), Postnasal drip (08/12/2016), Rash and other nonspecific skin eruption (11/08/2022), Umbilical hernia with obstruction, without gangrene (08/17/2016), Unspecified otitis externa, unspecified ear (09/21/2018), and Unspecified rotator cuff tear or rupture of right shoulder, not specified as traumatic (04/17/2017).    Surgical History  He has a past surgical history that includes  Colonoscopy (08/02/2017); Vasectomy (08/02/2017); Other surgical history (08/02/2017); Hernia repair (01/08/2014); and Appendectomy (01/08/2014).     Allergies  Patient has no known allergies.    Social History  He reports that he has never smoked. He has never used smokeless tobacco. He reports current alcohol use of about 2.0 standard drinks of alcohol per week. He reports that he does not use drugs.    Family History  Family History   Problem Relation Name Age of Onset   • Diabetes Mother     • Other (abestos exposure) Father     • Lung cancer Father           Last Recorded Vitals  Blood pressure 133/76, pulse 88, temperature 36.7 °C (98 °F), temperature source Temporal, resp. rate 16, height 1.829 m (6'), weight 101 kg (223 lb), SpO2 97%.    Physical Exam  Constitutional: Well-developed, well-nourished, alert and oriented, no acute distress  Skin: Warm and dry, no lesions, no rashes, no jaundice  HEENT: Normocephalic, atraumatic, EOMI, no scleral icterus, eyes have no redness or swelling or discharge, external inspection of ears and nose is normal, mucous membranes moist  Neck: Soft, nontender, no mass or adenopathy  Cardiac: Regular rate and rhythm, no murmur  Chest: Patent airway, clear to auscultation, normal breath sounds with good chest expansion, no wheezes or rales or rhonchi noted, thorax symmetric  Right inferior lateral chest wall 1.5 cm diameter skin opening with purulent drainage.  No surrounding swelling or erythema or tenderness.  Abdomen: Nondistended, positive bowel sounds, soft, nontender, no mass  Rectal: Not performed  Extremities: No injury, no lower extremity edema or calf tenderness  Lymphatic: No cervical adenopathy  Musculoskeletal: Range of motion intact, no joint swelling, normal strength  Neurological: Alert and oriented x3, intact sensory and motor function, no obvious focal neurologic abnormalities, normal gait  Psychological: Appropriate mood and behavior    Relevant Results  No  growth from bacterial or fungal culture.  AFB could not be performed.  I discussed the recent CT abdomen results with a radiologist.    Assessment/Plan  Diagnoses and all orders for this visit:  Abdominal wall abscess  -     CT abdomen wo IV contrast; Future  Chest wall abscess  Uncertain etiology for recurrent abscess.  Cultures have no growth so far.  Patient much improved.  Wound recultured for bacteria, fungal and AFB.  Patient will need repeat CT scan to document resolution of the abscess.  Ordered to be done at the time of his CT chest.  Follow-up after IR biopsy completed.    Mook Segovia MD

## 2024-06-23 LAB
BACTERIA SPEC CULT: NORMAL
GRAM STN SPEC: NORMAL
GRAM STN SPEC: NORMAL

## 2024-06-24 ENCOUNTER — TELEPHONE (OUTPATIENT)
Dept: GASTROENTEROLOGY | Facility: CLINIC | Age: 59
End: 2024-06-24
Payer: COMMERCIAL

## 2024-06-24 LAB
FUNGUS SPEC CULT: NORMAL
FUNGUS SPEC FUNGUS STN: NORMAL

## 2024-06-24 NOTE — TELEPHONE ENCOUNTER
Lab called to inform medical team that one of the labs ordered by Dr. Segovia was discarded due to being incorrectly collected.   Milly with the client services dept at the lab would like a call back to know how to proceed.

## 2024-06-24 NOTE — TELEPHONE ENCOUNTER
I called and spoke with Milly advising that we called the labs to be advised how to properly collect specimen for ABF. And was advised to use e swab. Per Milly this is incorrect and it needed to be put in sterile cup.     I advised Dr. Segovia of this and he states we are not going to bring patient back in, as there is no fluid to drain.

## 2024-06-25 LAB
FUNGUS SPEC CULT: NORMAL
FUNGUS SPEC FUNGUS STN: NORMAL

## 2024-06-26 NOTE — PROGRESS NOTES
Subjective   Saul Colon  is a 59 y.o. male who presents for evaluation of lung nodule.    He noted an abscess on his abdominal wall which led to recent imaging on which also showed a lung nodule. This abdominal abscess may relate back to an appendiceal operation I 2013 that required reoperation in 2016.     Currently the patient is  concerned about the abdominal abscess condition . He denies the following symptoms: chest pain, shortness of breath at rest, shortness of breath with activity, cough, hemoptysis, fevers, chills, and weight loss.      Medical history is notable for no history of MI, CVA or other major cardiovascular disease. He has no history of prior chest surgery.   He has no history of prior cancer.  He has a first degree relatives with lung cancer (father).     He  reports that he has never smoked. He has never used smokeless tobacco. He reports current alcohol use of about 2.0 standard drinks of alcohol per week. He reports that he does not use drugs.    Objective   Physical Exam  The patient is well-appearing and in no acute distress. The trachea is midline and there is no crepitus. The lungs were clear to auscultation grossly. There was good effort and excursion. The heart had a regular rate and rhythm. The abdomen was soft, nontender and nondistended. The extremities had no edema or gross deformities. Mood and affect are appropriate. Right flank wound - clean with packing.     Diagnostic Studies  I have reviewed a CT that shows a right lower lobe lung nodule immediately posterior to the liver    Assessment/Plan   Overall, I believe that the patient has a lung nodule of unclear etiology.     Based on the patient's clinical presentation and my review of their radiographic imaging, I believe the lung nodule is unlikely to represent a malignant process.  We discussed various management strategies including surgery, biopsy, and observation.  Based on this discussion, the patient elected for  observational management.  I recommend serial radiographic surveillance.    I recommend Nodify blood test.  If nodify indeterminate or low risk, CT 3 months, if high risk CT biopsy now.     I discussed this in detail with the patient, including a discussion of alternatives. They were comfortable with this approach.     Tyler Hawthorne MD  [phone]

## 2024-06-28 DIAGNOSIS — Z12.11 COLON CANCER SCREENING: Primary | ICD-10-CM

## 2024-06-28 RX ORDER — POLYETHYLENE GLYCOL 3350, SODIUM SULFATE ANHYDROUS, SODIUM BICARBONATE, SODIUM CHLORIDE, POTASSIUM CHLORIDE 236; 22.74; 6.74; 5.86; 2.97 G/4L; G/4L; G/4L; G/4L; G/4L
4000 POWDER, FOR SOLUTION ORAL ONCE
Qty: 4000 ML | Refills: 0 | Status: SHIPPED | OUTPATIENT
Start: 2024-06-28 | End: 2024-06-28

## 2024-06-29 DIAGNOSIS — E11.9 TYPE 2 DIABETES MELLITUS WITHOUT COMPLICATION, UNSPECIFIED WHETHER LONG TERM INSULIN USE (MULTI): Primary | ICD-10-CM

## 2024-07-01 LAB
FUNGUS SPEC CULT: NORMAL
FUNGUS SPEC CULT: NORMAL
FUNGUS SPEC FUNGUS STN: NORMAL
FUNGUS SPEC FUNGUS STN: NORMAL

## 2024-07-02 ENCOUNTER — APPOINTMENT (OUTPATIENT)
Dept: RADIOLOGY | Facility: CLINIC | Age: 59
End: 2024-07-02
Payer: COMMERCIAL

## 2024-07-02 ENCOUNTER — HOSPITAL ENCOUNTER (OUTPATIENT)
Dept: RADIOLOGY | Facility: HOSPITAL | Age: 59
Discharge: HOME | End: 2024-07-02
Payer: COMMERCIAL

## 2024-07-02 DIAGNOSIS — R91.1 LUNG NODULE: ICD-10-CM

## 2024-07-02 DIAGNOSIS — R91.1 PULMONARY NODULE 1 CM OR GREATER IN DIAMETER: ICD-10-CM

## 2024-07-02 DIAGNOSIS — L02.211 ABDOMINAL WALL ABSCESS: ICD-10-CM

## 2024-07-02 PROCEDURE — 71250 CT THORAX DX C-: CPT

## 2024-07-02 PROCEDURE — 2550000001 HC RX 255 CONTRASTS: Performed by: SURGERY

## 2024-07-02 PROCEDURE — 71250 CT THORAX DX C-: CPT | Performed by: RADIOLOGY

## 2024-07-02 PROCEDURE — 74150 CT ABDOMEN W/O CONTRAST: CPT | Performed by: RADIOLOGY

## 2024-07-02 PROCEDURE — 74150 CT ABDOMEN W/O CONTRAST: CPT

## 2024-07-02 PROCEDURE — A9698 NON-RAD CONTRAST MATERIALNOC: HCPCS | Performed by: SURGERY

## 2024-07-03 ENCOUNTER — OFFICE VISIT (OUTPATIENT)
Dept: WOUND CARE | Facility: CLINIC | Age: 59
End: 2024-07-03
Payer: COMMERCIAL

## 2024-07-03 ENCOUNTER — OFFICE VISIT (OUTPATIENT)
Dept: SURGERY | Facility: CLINIC | Age: 59
End: 2024-07-03
Payer: COMMERCIAL

## 2024-07-03 VITALS
TEMPERATURE: 97.8 F | HEIGHT: 71 IN | DIASTOLIC BLOOD PRESSURE: 85 MMHG | WEIGHT: 223 LBS | BODY MASS INDEX: 31.22 KG/M2 | OXYGEN SATURATION: 97 % | SYSTOLIC BLOOD PRESSURE: 130 MMHG | HEART RATE: 100 BPM

## 2024-07-03 DIAGNOSIS — R91.8 MULTIPLE LUNG NODULES: ICD-10-CM

## 2024-07-03 DIAGNOSIS — R91.1 PULMONARY NODULE 1 CM OR GREATER IN DIAMETER: ICD-10-CM

## 2024-07-03 PROCEDURE — 99205 OFFICE O/P NEW HI 60 MIN: CPT | Performed by: THORACIC SURGERY (CARDIOTHORACIC VASCULAR SURGERY)

## 2024-07-03 PROCEDURE — 99215 OFFICE O/P EST HI 40 MIN: CPT | Performed by: THORACIC SURGERY (CARDIOTHORACIC VASCULAR SURGERY)

## 2024-07-03 PROCEDURE — 4010F ACE/ARB THERAPY RXD/TAKEN: CPT | Performed by: THORACIC SURGERY (CARDIOTHORACIC VASCULAR SURGERY)

## 2024-07-03 PROCEDURE — 99213 OFFICE O/P EST LOW 20 MIN: CPT

## 2024-07-03 PROCEDURE — 3075F SYST BP GE 130 - 139MM HG: CPT | Performed by: THORACIC SURGERY (CARDIOTHORACIC VASCULAR SURGERY)

## 2024-07-03 PROCEDURE — 3079F DIAST BP 80-89 MM HG: CPT | Performed by: THORACIC SURGERY (CARDIOTHORACIC VASCULAR SURGERY)

## 2024-07-03 PROCEDURE — 3044F HG A1C LEVEL LT 7.0%: CPT | Performed by: THORACIC SURGERY (CARDIOTHORACIC VASCULAR SURGERY)

## 2024-07-03 ASSESSMENT — ENCOUNTER SYMPTOMS
DEPRESSION: 0
LOSS OF SENSATION IN FEET: 0
OCCASIONAL FEELINGS OF UNSTEADINESS: 0

## 2024-07-03 ASSESSMENT — PAIN SCALES - GENERAL: PAINLEVEL: 0-NO PAIN

## 2024-07-08 ENCOUNTER — HOSPITAL ENCOUNTER (OUTPATIENT)
Dept: RADIOLOGY | Facility: HOSPITAL | Age: 59
Discharge: HOME | End: 2024-07-08
Payer: COMMERCIAL

## 2024-07-08 VITALS
OXYGEN SATURATION: 99 % | DIASTOLIC BLOOD PRESSURE: 95 MMHG | SYSTOLIC BLOOD PRESSURE: 164 MMHG | WEIGHT: 223 LBS | BODY MASS INDEX: 31.22 KG/M2 | TEMPERATURE: 98.1 F | HEART RATE: 59 BPM | RESPIRATION RATE: 13 BRPM | HEIGHT: 71 IN

## 2024-07-08 DIAGNOSIS — L02.213 CHEST WALL ABSCESS: ICD-10-CM

## 2024-07-08 LAB
FUNGUS SPEC CULT: NORMAL
FUNGUS SPEC FUNGUS STN: NORMAL
INR PPP: 0.9 (ref 0.9–1.1)
PLATELET # BLD AUTO: 364 X10*3/UL (ref 150–450)
PROTHROMBIN TIME: 10.5 SECONDS (ref 9.8–12.8)

## 2024-07-08 PROCEDURE — 2500000005 HC RX 250 GENERAL PHARMACY W/O HCPCS: Performed by: RADIOLOGY

## 2024-07-08 PROCEDURE — 85049 AUTOMATED PLATELET COUNT: CPT | Performed by: RADIOLOGY

## 2024-07-08 PROCEDURE — 87070 CULTURE OTHR SPECIMN AEROBIC: CPT | Mod: PARLAB | Performed by: INTERNAL MEDICINE

## 2024-07-08 PROCEDURE — 2720000007 HC OR 272 NO HCPCS

## 2024-07-08 PROCEDURE — 36415 COLL VENOUS BLD VENIPUNCTURE: CPT | Performed by: RADIOLOGY

## 2024-07-08 PROCEDURE — 20206 BIOPSY MUSCLE PERQ NEEDLE: CPT

## 2024-07-08 PROCEDURE — 85610 PROTHROMBIN TIME: CPT | Performed by: RADIOLOGY

## 2024-07-08 PROCEDURE — 87102 FUNGUS ISOLATION CULTURE: CPT | Mod: PARLAB | Performed by: INTERNAL MEDICINE

## 2024-07-08 RX ORDER — LIDOCAINE HYDROCHLORIDE 10 MG/ML
INJECTION, SOLUTION EPIDURAL; INFILTRATION; INTRACAUDAL; PERINEURAL
Status: COMPLETED | OUTPATIENT
Start: 2024-07-08 | End: 2024-07-08

## 2024-07-08 ASSESSMENT — PAIN SCALES - GENERAL
PAINLEVEL_OUTOF10: 0 - NO PAIN

## 2024-07-08 ASSESSMENT — PAIN - FUNCTIONAL ASSESSMENT
PAIN_FUNCTIONAL_ASSESSMENT: 0-10
PAIN_FUNCTIONAL_ASSESSMENT: 0-10

## 2024-07-08 NOTE — PRE-PROCEDURE NOTE
Interventional Radiology Preprocedure Note    Indication for procedure: The encounter diagnosis was Chest wall abscess.     Relevant review of systems: NA    Relevant Labs:   Lab Results   Component Value Date    CREATININE 0.88 06/13/2024    EGFR >90 06/13/2024    INR 0.9 07/08/2024    PROTIME 10.5 07/08/2024       Planned Sedation/Anesthesia: Moderate    Airway assessment: normal    Directed physical examination:    Aox3  No increased work of breathing.   No acute distress      Mallampati: II (hard and soft palate, upper portion of tonsils and uvula visible)    ASA Score: ASA 2 - Patient with mild systemic disease with no functional limitations    Benefits, risks and alternatives of procedure and planned sedation have been discussed with the patient and/or their representative. All questions answered and they agree to proceed.    DIZZINESS/FATIGUE

## 2024-07-08 NOTE — Clinical Note
The site was marked. Prepped: right chest and right abdomen. Prepped with: ChloraPrep. The patient was draped.

## 2024-07-08 NOTE — PROCEDURES
Interventional Radiology Brief Postprocedure Note    Attending: Sal Winchester MD    Assistant:   Staff Role   Annie Valerio Radiology Technologist   Rolo Souza, RN Radiology Nurse   Sal Winchester MD Radiologist       Diagnosis:   1. Chest wall abscess  US guided soft tissue biopsy    US guided soft tissue biopsy    AFB Culture/Smear    AFB Culture/Smear    Fungal Culture/Smear    Fungal Culture/Smear    Surgical Pathology Exam    Surgical Pathology Exam          Description of procedure: US guided soft tissue biopsy 18 G x 4    Timeout:  Yes    Procedure Area: Procedure Area     Anesthesia:   Conscious Sedation    Complications: None    Estimated Blood Loss: minimal    Medications (Filter: Administrations occurring from 1047 to 1126 on 07/08/24) As of 07/08/24 1126      oxygen (O2) therapy (L/min) Total volume:  Not documented* Dosing weight:  101   *Total volume has not been documented. View each administration to see the amount administered.     Date/Time Rate/Dose/Volume Action       07/08/24  1051 3 L/min Start               lidocaine PF (Xylocaine) 10 mg/mL (1 %) injection (mL) Total volume:  5 mL      Date/Time Rate/Dose/Volume Action       07/08/24  1112 5 mL Given                   ID Type Source Tests Collected by Time   1 : right abdominal/chest wall mass Tissue SOFT TISSUE BIOPSY SURGICAL PATHOLOGY EXAM Sal Winchester MD 7/8/2024 1058   A : right chest wall abscess Fluid ABSCESS AFB CULTURE/SMEAR, FUNGAL CULTURE/SMEAR Sal Winchester MD 7/8/2024 1057         See detailed result report with images in PACS.    The patient tolerated the procedure well without incident or complication and is in stable condition.

## 2024-07-09 ENCOUNTER — TELEPHONE (OUTPATIENT)
Dept: PRIMARY CARE | Facility: CLINIC | Age: 59
End: 2024-07-09
Payer: COMMERCIAL

## 2024-07-10 ENCOUNTER — OFFICE VISIT (OUTPATIENT)
Dept: WOUND CARE | Facility: CLINIC | Age: 59
End: 2024-07-10
Payer: COMMERCIAL

## 2024-07-10 DIAGNOSIS — R91.1 PULMONARY NODULE 1 CM OR GREATER IN DIAMETER: Primary | ICD-10-CM

## 2024-07-10 LAB
BACTERIA SPEC CULT: NORMAL
GRAM STN SPEC: NORMAL
GRAM STN SPEC: NORMAL

## 2024-07-10 PROCEDURE — 99213 OFFICE O/P EST LOW 20 MIN: CPT

## 2024-07-11 ENCOUNTER — ANESTHESIA EVENT (OUTPATIENT)
Dept: GASTROENTEROLOGY | Facility: HOSPITAL | Age: 59
End: 2024-07-11
Payer: COMMERCIAL

## 2024-07-11 ENCOUNTER — TELEPHONE (OUTPATIENT)
Dept: SURGERY | Facility: HOSPITAL | Age: 59
End: 2024-07-11

## 2024-07-11 ENCOUNTER — ANESTHESIA (OUTPATIENT)
Dept: GASTROENTEROLOGY | Facility: HOSPITAL | Age: 59
End: 2024-07-11
Payer: COMMERCIAL

## 2024-07-11 ENCOUNTER — HOSPITAL ENCOUNTER (OUTPATIENT)
Dept: GASTROENTEROLOGY | Facility: HOSPITAL | Age: 59
Setting detail: OUTPATIENT SURGERY
Discharge: HOME | End: 2024-07-11
Payer: COMMERCIAL

## 2024-07-11 VITALS
BODY MASS INDEX: 30.16 KG/M2 | TEMPERATURE: 97.5 F | HEART RATE: 69 BPM | DIASTOLIC BLOOD PRESSURE: 75 MMHG | SYSTOLIC BLOOD PRESSURE: 134 MMHG | HEIGHT: 72 IN | RESPIRATION RATE: 16 BRPM | WEIGHT: 222.66 LBS | OXYGEN SATURATION: 97 %

## 2024-07-11 DIAGNOSIS — Z12.11 SCREENING FOR COLON CANCER: ICD-10-CM

## 2024-07-11 LAB — GLUCOSE BLD MANUAL STRIP-MCNC: 107 MG/DL (ref 74–99)

## 2024-07-11 PROCEDURE — 3700000002 HC GENERAL ANESTHESIA TIME - EACH INCREMENTAL 1 MINUTE

## 2024-07-11 PROCEDURE — 45385 COLONOSCOPY W/LESION REMOVAL: CPT | Performed by: STUDENT IN AN ORGANIZED HEALTH CARE EDUCATION/TRAINING PROGRAM

## 2024-07-11 PROCEDURE — 7100000010 HC PHASE TWO TIME - EACH INCREMENTAL 1 MINUTE

## 2024-07-11 PROCEDURE — 3700000001 HC GENERAL ANESTHESIA TIME - INITIAL BASE CHARGE

## 2024-07-11 PROCEDURE — 7100000009 HC PHASE TWO TIME - INITIAL BASE CHARGE

## 2024-07-11 PROCEDURE — 2500000004 HC RX 250 GENERAL PHARMACY W/ HCPCS (ALT 636 FOR OP/ED): Performed by: STUDENT IN AN ORGANIZED HEALTH CARE EDUCATION/TRAINING PROGRAM

## 2024-07-11 PROCEDURE — 2500000004 HC RX 250 GENERAL PHARMACY W/ HCPCS (ALT 636 FOR OP/ED): Performed by: NURSE ANESTHETIST, CERTIFIED REGISTERED

## 2024-07-11 PROCEDURE — 82947 ASSAY GLUCOSE BLOOD QUANT: CPT

## 2024-07-11 RX ORDER — SODIUM CHLORIDE, SODIUM LACTATE, POTASSIUM CHLORIDE, CALCIUM CHLORIDE 600; 310; 30; 20 MG/100ML; MG/100ML; MG/100ML; MG/100ML
20 INJECTION, SOLUTION INTRAVENOUS CONTINUOUS
Status: DISCONTINUED | OUTPATIENT
Start: 2024-07-11 | End: 2024-07-12 | Stop reason: HOSPADM

## 2024-07-11 RX ORDER — PROPOFOL 10 MG/ML
INJECTION, EMULSION INTRAVENOUS AS NEEDED
Status: DISCONTINUED | OUTPATIENT
Start: 2024-07-11 | End: 2024-07-11

## 2024-07-11 RX ORDER — ONDANSETRON HYDROCHLORIDE 2 MG/ML
4 INJECTION, SOLUTION INTRAVENOUS ONCE AS NEEDED
Status: DISCONTINUED | OUTPATIENT
Start: 2024-07-11 | End: 2024-07-12 | Stop reason: HOSPADM

## 2024-07-11 SDOH — HEALTH STABILITY: MENTAL HEALTH: CURRENT SMOKER: 0

## 2024-07-11 ASSESSMENT — PAIN - FUNCTIONAL ASSESSMENT
PAIN_FUNCTIONAL_ASSESSMENT: 0-10

## 2024-07-11 ASSESSMENT — PAIN SCALES - GENERAL
PAINLEVEL_OUTOF10: 0 - NO PAIN

## 2024-07-11 NOTE — TELEPHONE ENCOUNTER
I called and spoke to the pt's wife regarding his Nodify lab results.  His pre-test was 35% and the Final reduced to 23%.  I explained that Dr. Hawthorne initially wanted a CT Biopsy, but since Mr. Colon just had an IR biopsy of his right chest wall abscess on 7/8, we would wait for those pathology results and get a PET scan now, which was scheduled for 7/15.  I advised his wife that we would watch for those results as well as pathology and would give them a call for any further plan.  She verbalized understanding.  No further questions at this time.

## 2024-07-11 NOTE — ANESTHESIA POSTPROCEDURE EVALUATION
Patient: Saul Colon    Procedure Summary       Date: 07/11/24 Room / Location: [facility]    Anesthesia Start: 0905 Anesthesia Stop:     Procedure: COLONOSCOPY Diagnosis: Screening for colon cancer    Scheduled Providers: Gerald Taylor MD; Stefano Burrell MD Responsible Provider: Stefano Burrell MD    Anesthesia Type: MAC ASA Status: 3            Anesthesia Type: MAC    Vitals Value Taken Time   /79 07/11/24 0947   Temp 36.4 07/11/24 0947   Pulse 73 07/11/24 0947   Resp 18 07/11/24 0947   SpO2 97 07/11/24 0947       Anesthesia Post Evaluation    Patient location during evaluation: PACU  Patient participation: complete - patient participated  Level of consciousness: awake and alert  Pain management: adequate  Airway patency: patent  Cardiovascular status: acceptable, blood pressure returned to baseline and hemodynamically stable  Respiratory status: acceptable and face mask  Hydration status: acceptable  Postoperative Nausea and Vomiting: none        There were no known notable events for this encounter.

## 2024-07-11 NOTE — DISCHARGE INSTRUCTIONS

## 2024-07-11 NOTE — ANESTHESIA PREPROCEDURE EVALUATION
Patient: Saul Colon    Procedure Information       Date/Time: 07/11/24 0905    Scheduled providers: Gerald Taylor MD; Stefano Burrell MD    Procedure: COLONOSCOPY    Location: [facility]            Relevant Problems   Anesthesia (within normal limits)      Cardiac   (+) Benign essential hypertension   (+) Superior mesenteric artery stenosis (Multi)      GI   (+) Gastroesophageal reflux disease      Endocrine   (+) Type 2 diabetes mellitus without complication (Multi)      Hematology   (+) Anemia      HEENT   (+) Sensorineural hearing loss (SNHL) of both ears      ID   (+) Infection in abdomen (Multi)      Skin   (+) Rash       Clinical information reviewed:    Allergies  Meds               NPO Detail:  NPO/Void Status  Date of Last Liquid: 07/11/24  Time of Last Liquid: 0100  Date of Last Solid: 07/10/24  Time of Last Solid: 0900  Last Intake Type: Clear fluids         Physical Exam    Airway  Mallampati: IV  TM distance: >3 FB  Neck ROM: full     Cardiovascular - normal exam     Dental - normal exam     Pulmonary - normal exam     Abdominal - normal exam             Anesthesia Plan    History of general anesthesia?: yes  History of complications of general anesthesia?: no    ASA 3     MAC     The patient is not a current smoker.    intravenous induction   Anesthetic plan and risks discussed with patient.    Plan discussed with CRNA.

## 2024-07-12 LAB
FUNGUS SPEC CULT: NORMAL
FUNGUS SPEC FUNGUS STN: NORMAL

## 2024-07-15 ENCOUNTER — HOSPITAL ENCOUNTER (OUTPATIENT)
Dept: RADIOLOGY | Facility: CLINIC | Age: 59
Discharge: HOME | End: 2024-07-15
Payer: COMMERCIAL

## 2024-07-15 DIAGNOSIS — R91.1 PULMONARY NODULE 1 CM OR GREATER IN DIAMETER: ICD-10-CM

## 2024-07-15 LAB
FUNGUS SPEC CULT: NORMAL
FUNGUS SPEC FUNGUS STN: NORMAL
GLUCOSE BLD MANUAL STRIP-MCNC: 110 MG/DL (ref 74–99)

## 2024-07-15 PROCEDURE — 82947 ASSAY GLUCOSE BLOOD QUANT: CPT

## 2024-07-15 PROCEDURE — 3430000001 HC RX 343 DIAGNOSTIC RADIOPHARMACEUTICALS: Performed by: THORACIC SURGERY (CARDIOTHORACIC VASCULAR SURGERY)

## 2024-07-15 PROCEDURE — 78815 PET IMAGE W/CT SKULL-THIGH: CPT | Mod: PI

## 2024-07-15 PROCEDURE — A9552 F18 FDG: HCPCS | Performed by: THORACIC SURGERY (CARDIOTHORACIC VASCULAR SURGERY)

## 2024-07-15 PROCEDURE — 78815 PET IMAGE W/CT SKULL-THIGH: CPT | Mod: PET TUMOR INIT TX STRAT | Performed by: RADIOLOGY

## 2024-07-15 RX ORDER — FLUDEOXYGLUCOSE F 18 200 MCI/ML
13.47 INJECTION, SOLUTION INTRAVENOUS
Status: COMPLETED | OUTPATIENT
Start: 2024-07-15 | End: 2024-07-15

## 2024-07-16 LAB
LAB AP ASR DISCLAIMER: NORMAL
LABORATORY COMMENT REPORT: NORMAL
PATH REPORT.FINAL DX SPEC: NORMAL
PATH REPORT.GROSS SPEC: NORMAL
PATH REPORT.TOTAL CANCER: NORMAL

## 2024-07-17 DIAGNOSIS — R91.1 LUNG NODULE: ICD-10-CM

## 2024-07-22 ENCOUNTER — APPOINTMENT (OUTPATIENT)
Dept: SURGERY | Facility: CLINIC | Age: 59
End: 2024-07-22
Payer: COMMERCIAL

## 2024-07-22 VITALS
TEMPERATURE: 97.8 F | SYSTOLIC BLOOD PRESSURE: 137 MMHG | BODY MASS INDEX: 31.15 KG/M2 | WEIGHT: 230 LBS | DIASTOLIC BLOOD PRESSURE: 85 MMHG | RESPIRATION RATE: 16 BRPM | HEART RATE: 79 BPM | HEIGHT: 72 IN | OXYGEN SATURATION: 95 %

## 2024-07-22 DIAGNOSIS — L02.213 CHEST WALL ABSCESS: ICD-10-CM

## 2024-07-22 DIAGNOSIS — L02.211 ABDOMINAL WALL ABSCESS: Primary | ICD-10-CM

## 2024-07-22 LAB
FUNGUS SPEC CULT: NORMAL
FUNGUS SPEC FUNGUS STN: NORMAL

## 2024-07-22 PROCEDURE — 3079F DIAST BP 80-89 MM HG: CPT | Performed by: SURGERY

## 2024-07-22 PROCEDURE — 4010F ACE/ARB THERAPY RXD/TAKEN: CPT | Performed by: SURGERY

## 2024-07-22 PROCEDURE — 3044F HG A1C LEVEL LT 7.0%: CPT | Performed by: SURGERY

## 2024-07-22 PROCEDURE — 3075F SYST BP GE 130 - 139MM HG: CPT | Performed by: SURGERY

## 2024-07-22 PROCEDURE — 3008F BODY MASS INDEX DOCD: CPT | Performed by: SURGERY

## 2024-07-22 PROCEDURE — 99213 OFFICE O/P EST LOW 20 MIN: CPT | Performed by: SURGERY

## 2024-07-22 PROCEDURE — 1036F TOBACCO NON-USER: CPT | Performed by: SURGERY

## 2024-07-22 NOTE — PROGRESS NOTES
"History Of Present Illness  HPI   June 21, 2024  The patient is a 59-year-old male who on Mona 10, 2024 had incision and drainage of a right inferior lateral chest wall abscess with local anesthetic at the bedside.  On CT scan the abscess appeared to extend through the right inferior lateral chest wall into the right lateral peritoneal space and pleural space.  The area improved following incision and drainage.  He was treated with IV antibiotics per infectious disease service recommendations.  He was discharged home on June 14, 2024 with Augmentin to complete a 14-day course of antibiotics.  This abscess is in the same location as the perihepatic abscess which was percutaneously drained in 2016.  The site of the incision and drainage was adjacent to the scar from his drain placement.  He was scheduled to have interventional radiology percutaneously biopsy the soft tissue in the region of the chest wall abscess in order to rule out neoplasm.  Culture from the abscess had no growth.  Fungal culture has been negative to date.  AFB culture was discontinued.  The patient reports that he feels much better.  The pain is much improved.  He still has tan fluid draining from the wound.  No fevers or chills.  He continues on antibiotics per infectious disease service.  He is scheduled for a CT chest on July 2 followed by evaluation by Dr. Hawthorne from thoracic surgery.  Biopsy by interventional radiology is scheduled July 8.  Colonoscopy is scheduled July 11.  Vascular surgery, Dr. Gotti, evaluated the patient and placed him on aspirin due to mesenteric vessel stenosis.     July 22, 2024  The patient feels \"great\".  He has no pain.  He reports that he completed his antibiotics June 28 and infectious disease service has not recommended additional antibiotics.  He continues to have some drainage from the right chest wound.  He was evaluated by Dr. Hawthorne from thoracic surgery.  He had percutaneous biopsy of the right chest wall mass " by interventional radiology on July 8, 2024.  Pathology showed soft tissue with acute inflammation, favor reactive changes.  GMS and AFB were negative for fungal and acid-fast organisms.  Colonoscopy was done on July 11, 2024 by Dr. Taylor.  Repeat CT abdomen was performed on July 2, 2024.    Past medical history:  Laparoscopic appendectomy in 2013 for a perforated appendix at [facility].  Perihepatic abscess percutaneously drained in October 2016.  Laparotomy with excision of urachal cyst at [facility] in November 2016.  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), Other conditions influencing health status (08/22/2016), Other shoulder lesions, right shoulder (06/29/2016), Pain in right knee (04/04/2022), Personal history of diseases of the blood and blood-forming organs and certain disorders involving the immune mechanism (04/17/2017), Personal history of diseases of the skin and subcutaneous tissue (08/16/2017), Personal history of other diseases of the digestive system, Personal history of other diseases of the musculoskeletal system and connective tissue (01/19/2015), Personal history of other diseases of the respiratory system (02/15/2016), Plantar fascial fibromatosis (12/12/2014), Postnasal drip (08/12/2016), Rash and other nonspecific skin eruption (11/08/2022), Umbilical hernia with obstruction, without gangrene (08/17/2016), Unspecified otitis externa, unspecified ear (09/21/2018), and Unspecified rotator cuff tear or rupture of right shoulder, not specified as traumatic  (04/17/2017).    Surgical History  He has a past surgical history that includes Colonoscopy (08/02/2017); Vasectomy (08/02/2017); Other surgical history (08/02/2017); Hernia repair (01/08/2014); and Appendectomy (01/08/2014).     Allergies  Patient has no known allergies.    Social History  He reports that he has never smoked. He has never used smokeless tobacco. He reports current alcohol use of about 2.0 standard drinks of alcohol per week. He reports that he does not use drugs.    Family History  Family History   Problem Relation Name Age of Onset    Diabetes Mother      Other (abestos exposure) Father      Lung cancer Father       Last Recorded Vitals  Blood pressure 137/85, pulse 79, temperature 36.6 °C (97.8 °F), temperature source Temporal, resp. rate 16, height 1.829 m (6'), weight 104 kg (230 lb), SpO2 95%.    Physical Exam  Well-developed, well-nourished, no acute distress.  Right inferior lateral chest wall has 1 cm diameter open wound with healthy granulation tissue.  Minimal drainage.  No cellulitis.  Nontender.    Relevant Results  I reviewed the CT/PET report  IMPRESSION:  1. Focal hypermetabolic activity seen in the right flank, extending  from the dermis into the intercostal space. Findings are nonspecific  and may represent infectious/inflammatory process, although  neoplastic process can not be excluded. Localizing PET-CT images were  sent to PACS.  2. A non FDG avid pulmonary nodule is seen in the right middle lobe.  Follow-up chest CT is recommended.      I reviewed the CT abdomen report and images from July 2, 2024.  IMPRESSION:  1.  2.4 x 3.5 cm soft tissue mass with irregular margins is once  again noted to extend from the dermis into the intercostal space  within the right flank with overlying small soft tissue ulceration or  defect. There is persistent asymmetric enlargement and edema of the  right abdominal wall musculature with stable calcification in the  muscles. This has improved  minimally since the prior examination  indicating slight improvement in the abdominal wall abscess and  associated edema of the abdominal wall musculature.  2. There are a few small pericardial lymph nodes which are unchanged.  3. Mild hepatomegaly.        Surgical Pathology Exam: [id number]  Order: [id number]   Collected 7/8/2024 10:58       Status: Final result       Visible to patient: No (inaccessible in [facility])       Dx: Chest wall abscess    0 Result Notes      Component    FINAL DIAGNOSIS      A.  SOFT TISSUE, RIGHT ABDOMINAL/CHEST WALL MASS, BIOPSY:  - Soft tissue with acute inflammation, granulation tissue, reactive fibroblastic proliferation; favor reactive changes. See note.   - GMS and AFB are negative for fungal and acid-fast organisms, respectively.      Note: Core biopsy sections demonstrate soft tissue with a foci of dense acute inflammation, granulation tissue, and scattered multinucleated giant cells. The remainder of the core biopsy demonstrates fibroelastotic tissue composed of bland spindled cells with myxoid changes, and acute and chronic inflammation.  There is also benign skeletal muscle seen.  Immunohistochemical stains performed on formalin-fixed, paraffin embedded sections from block A1 demonstrate spindled cells to be weakly positive for SMA, focally positive for WT-1, and negative for AE1/AE3, calretinin, ALK (D5F3), STAT6, Beta-catenin and cyclin-D1. Overall, the findings are favored to represent reactive changes, perhaps from a healing infection with subsequent scar formation. The current specimen does not support evidence of a neoplastic process. If clinical suspicion persists, or if these findings are not favored to represent the lesion in question, re-biopsy may be considered as clinically indicated.      Staff Consultants: Gloria Del Rosario, JOSH Alvarez and ANGE Rubio.               Assessment/Plan   Diagnoses and all orders for this visit:  Abdominal wall abscess  Chest wall  abscess  Right lateral abdominal wall/chest wall abscess of uncertain etiology.  Repeat CT scan shows slight improvement.  Core biopsy shows acute inflammation.  PET scan consistent with inflammatory process.  Follow-up in 1 month for a reevaluation of wound.  Repeat CT abdomen in 3 months, at time of repeat CT chest.        Mook Segovia MD

## 2024-07-22 NOTE — LETTER
July 22, 2024     Josse Al MD  [address]  [facility] [address]    Patient: Saul Colon   YOB: 1965   Date of Visit: 7/22/2024       Dear Dr. Josse Al MD:    Thank you for referring Saul Colon to me for evaluation. Below are my notes for this consultation.  If you have questions, please do not hesitate to call me. I look forward to following your patient along with you.       Sincerely,     Mook Segovia MD      CC: No Recipients  ______________________________________________________________________________________    History Of Present Illness  HPI   June 21, 2024  The patient is a 59-year-old male who on Mona 10, 2024 had incision and drainage of a right inferior lateral chest wall abscess with local anesthetic at the bedside.  On CT scan the abscess appeared to extend through the right inferior lateral chest wall into the right lateral peritoneal space and pleural space.  The area improved following incision and drainage.  He was treated with IV antibiotics per infectious disease service recommendations.  He was discharged home on June 14, 2024 with Augmentin to complete a 14-day course of antibiotics.  This abscess is in the same location as the perihepatic abscess which was percutaneously drained in 2016.  The site of the incision and drainage was adjacent to the scar from his drain placement.  He was scheduled to have interventional radiology percutaneously biopsy the soft tissue in the region of the chest wall abscess in order to rule out neoplasm.  Culture from the abscess had no growth.  Fungal culture has been negative to date.  AFB culture was discontinued.  The patient reports that he feels much better.  The pain is much improved.  He still has tan fluid draining from the wound.  No fevers or chills.  He continues on antibiotics per infectious disease service.  He is scheduled for a CT chest on July 2 followed by evaluation by   "Christoph from thoracic surgery.  Biopsy by interventional radiology is scheduled July 8.  Colonoscopy is scheduled July 11.  Vascular surgery, Dr. Gotti, evaluated the patient and placed him on aspirin due to mesenteric vessel stenosis.     July 22, 2024  The patient feels \"great\".  He has no pain.  He reports that he completed his antibiotics June 28 and infectious disease service has not recommended additional antibiotics.  He continues to have some drainage from the right chest wound.  He was evaluated by Dr. Hawthorne from thoracic surgery.  He had percutaneous biopsy of the right chest wall mass by interventional radiology on July 8, 2024.  Pathology showed soft tissue with acute inflammation, favor reactive changes.  GMS and AFB were negative for fungal and acid-fast organisms.  Colonoscopy was done on July 11, 2024 by Dr. Taylor.  Repeat CT abdomen was performed on July 2, 2024.    Past medical history:  Laparoscopic appendectomy in 2013 for a perforated appendix at [facility].  Perihepatic abscess percutaneously drained in October 2016.  Laparotomy with excision of urachal cyst at [facility] in November 2016.  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), Other conditions influencing health status (08/22/2016), Other shoulder lesions, right shoulder (06/29/2016), Pain in right knee (04/04/2022), Personal history of diseases of the blood and blood-forming organs and certain disorders involving the immune mechanism (04/17/2017), Personal history of diseases of the skin and subcutaneous tissue " (08/16/2017), Personal history of other diseases of the digestive system, Personal history of other diseases of the musculoskeletal system and connective tissue (01/19/2015), Personal history of other diseases of the respiratory system (02/15/2016), Plantar fascial fibromatosis (12/12/2014), Postnasal drip (08/12/2016), Rash and other nonspecific skin eruption (11/08/2022), Umbilical hernia with obstruction, without gangrene (08/17/2016), Unspecified otitis externa, unspecified ear (09/21/2018), and Unspecified rotator cuff tear or rupture of right shoulder, not specified as traumatic (04/17/2017).    Surgical History  He has a past surgical history that includes Colonoscopy (08/02/2017); Vasectomy (08/02/2017); Other surgical history (08/02/2017); Hernia repair (01/08/2014); and Appendectomy (01/08/2014).     Allergies  Patient has no known allergies.    Social History  He reports that he has never smoked. He has never used smokeless tobacco. He reports current alcohol use of about 2.0 standard drinks of alcohol per week. He reports that he does not use drugs.    Family History  Family History   Problem Relation Name Age of Onset   • Diabetes Mother     • Other (abestos exposure) Father     • Lung cancer Father       Last Recorded Vitals  Blood pressure 137/85, pulse 79, temperature 36.6 °C (97.8 °F), temperature source Temporal, resp. rate 16, height 1.829 m (6'), weight 104 kg (230 lb), SpO2 95%.    Physical Exam  Well-developed, well-nourished, no acute distress.  Right inferior lateral chest wall has 1 cm diameter open wound with healthy granulation tissue.  Minimal drainage.  No cellulitis.  Nontender.    Relevant Results  I reviewed the CT/PET report  IMPRESSION:  1. Focal hypermetabolic activity seen in the right flank, extending  from the dermis into the intercostal space. Findings are nonspecific  and may represent infectious/inflammatory process, although  neoplastic process can not be excluded.  Localizing PET-CT images were  sent to PACS.  2. A non FDG avid pulmonary nodule is seen in the right middle lobe.  Follow-up chest CT is recommended.      I reviewed the CT abdomen report and images from July 2, 2024.  IMPRESSION:  1.  2.4 x 3.5 cm soft tissue mass with irregular margins is once  again noted to extend from the dermis into the intercostal space  within the right flank with overlying small soft tissue ulceration or  defect. There is persistent asymmetric enlargement and edema of the  right abdominal wall musculature with stable calcification in the  muscles. This has improved minimally since the prior examination  indicating slight improvement in the abdominal wall abscess and  associated edema of the abdominal wall musculature.  2. There are a few small pericardial lymph nodes which are unchanged.  3. Mild hepatomegaly.        Surgical Pathology Exam: [id number]  Order: [id number]   Collected 7/8/2024 10:58       Status: Final result       Visible to patient: No (inaccessible in [facility])       Dx: Chest wall abscess    0 Result Notes      Component    FINAL DIAGNOSIS      A.  SOFT TISSUE, RIGHT ABDOMINAL/CHEST WALL MASS, BIOPSY:  - Soft tissue with acute inflammation, granulation tissue, reactive fibroblastic proliferation; favor reactive changes. See note.   - GMS and AFB are negative for fungal and acid-fast organisms, respectively.      Note: Core biopsy sections demonstrate soft tissue with a foci of dense acute inflammation, granulation tissue, and scattered multinucleated giant cells. The remainder of the core biopsy demonstrates fibroelastotic tissue composed of bland spindled cells with myxoid changes, and acute and chronic inflammation.  There is also benign skeletal muscle seen.  Immunohistochemical stains performed on formalin-fixed, paraffin embedded sections from block A1 demonstrate spindled cells to be weakly positive for SMA, focally positive for WT-1, and negative for AE1/AE3,  calretinin, ALK (D5F3), STAT6, Beta-catenin and cyclin-D1. Overall, the findings are favored to represent reactive changes, perhaps from a healing infection with subsequent scar formation. The current specimen does not support evidence of a neoplastic process. If clinical suspicion persists, or if these findings are not favored to represent the lesion in question, re-biopsy may be considered as clinically indicated.      Staff Consultants: Gloria Del Rosario, JOSH Alvarez and ANGE Rubio.               Assessment/Plan  Diagnoses and all orders for this visit:  Abdominal wall abscess  Chest wall abscess  Right lateral abdominal wall/chest wall abscess of uncertain etiology.  Repeat CT scan shows slight improvement.  Core biopsy shows acute inflammation.  PET scan consistent with inflammatory process.  Follow-up in 1 month for a reevaluation of wound.  Repeat CT abdomen in 3 months, at time of repeat CT chest.        Mook Segovia MD

## 2024-07-23 LAB
LABORATORY COMMENT REPORT: NORMAL
PATH REPORT.FINAL DX SPEC: NORMAL
PATH REPORT.GROSS SPEC: NORMAL
PATH REPORT.TOTAL CANCER: NORMAL

## 2024-07-29 DIAGNOSIS — I10 BENIGN ESSENTIAL HYPERTENSION: Primary | ICD-10-CM

## 2024-07-29 LAB
FUNGUS SPEC CULT: NORMAL
FUNGUS SPEC FUNGUS STN: NORMAL

## 2024-07-31 RX ORDER — LISINOPRIL 20 MG/1
20 TABLET ORAL DAILY
Qty: 90 TABLET | Refills: 3 | Status: SHIPPED | OUTPATIENT
Start: 2024-07-31

## 2024-08-05 ENCOUNTER — OFFICE VISIT (OUTPATIENT)
Dept: WOUND CARE | Facility: CLINIC | Age: 59
End: 2024-08-05
Payer: COMMERCIAL

## 2024-08-05 PROCEDURE — 99213 OFFICE O/P EST LOW 20 MIN: CPT

## 2024-08-14 ENCOUNTER — OFFICE VISIT (OUTPATIENT)
Dept: WOUND CARE | Facility: CLINIC | Age: 59
End: 2024-08-14
Payer: COMMERCIAL

## 2024-08-14 PROCEDURE — 99213 OFFICE O/P EST LOW 20 MIN: CPT

## 2024-08-20 ENCOUNTER — TELEPHONE (OUTPATIENT)
Dept: PRIMARY CARE | Facility: CLINIC | Age: 59
End: 2024-08-20
Payer: COMMERCIAL

## 2024-08-20 NOTE — PROGRESS NOTES
"History Of Present Illness  HPI    June 21, 2024  The patient is a 59-year-old male who on Mona 10, 2024 had incision and drainage of a right inferior lateral chest wall abscess with local anesthetic at the bedside.  On CT scan the abscess appeared to extend through the right inferior lateral chest wall into the right lateral peritoneal space and pleural space.  The area improved following incision and drainage.  He was treated with IV antibiotics per infectious disease service recommendations.  He was discharged home on June 14, 2024 with Augmentin to complete a 14-day course of antibiotics.  This abscess is in the same location as the perihepatic abscess which was percutaneously drained in 2016.  The site of the incision and drainage was adjacent to the scar from his drain placement.  He was scheduled to have interventional radiology percutaneously biopsy the soft tissue in the region of the chest wall abscess in order to rule out neoplasm.  Culture from the abscess had no growth.  Fungal culture has been negative to date.  AFB culture was discontinued.  The patient reports that he feels much better.  The pain is much improved.  He still has tan fluid draining from the wound.  No fevers or chills.  He continues on antibiotics per infectious disease service.  He is scheduled for a CT chest on July 2 followed by evaluation by Dr. Hawthorne from thoracic surgery.  Biopsy by interventional radiology is scheduled July 8.  Colonoscopy is scheduled July 11.  Vascular surgery, Dr. Gotti, evaluated the patient and placed him on aspirin due to mesenteric vessel stenosis.     July 22, 2024  The patient feels \"great\".  He has no pain.  He reports that he completed his antibiotics June 28 and infectious disease service has not recommended additional antibiotics.  He continues to have some drainage from the right chest wound.  He was evaluated by Dr. Hawthorne from thoracic surgery.  He had percutaneous biopsy of the right chest wall " mass by interventional radiology on July 8, 2024.  Pathology showed soft tissue with acute inflammation, favor reactive changes.  GMS and AFB were negative for fungal and acid-fast organisms.  Colonoscopy was done on July 11, 2024 by Dr. Taylor.  Repeat CT abdomen was performed on July 2, 2024.    August 21, 2024  No pain.  Minimal drainage from wound.  The patient is following with infectious disease/wound service every 2 weeks.    Past medical history:  Laparoscopic appendectomy in 2013 for a perforated appendix at [facility].  Perihepatic abscess percutaneously drained in October 2016.  Laparotomy with excision of urachal cyst at [facility] in November 2016.  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), Other conditions influencing health status (08/22/2016), Other shoulder lesions, right shoulder (06/29/2016), Pain in right knee (04/04/2022), Personal history of diseases of the blood and blood-forming organs and certain disorders involving the immune mechanism (04/17/2017), Personal history of diseases of the skin and subcutaneous tissue (08/16/2017), Personal history of other diseases of the digestive system, Personal history of other diseases of the musculoskeletal system and connective tissue (01/19/2015), Personal history of other diseases of the respiratory system (02/15/2016), Plantar fascial fibromatosis (12/12/2014), Postnasal drip (08/12/2016), Rash and other nonspecific skin eruption (11/08/2022), Umbilical hernia with obstruction, without gangrene (08/17/2016), Unspecified otitis  "externa, unspecified ear (09/21/2018), and Unspecified rotator cuff tear or rupture of right shoulder, not specified as traumatic (04/17/2017).    Surgical History  He has a past surgical history that includes Colonoscopy (08/02/2017); Vasectomy (08/02/2017); Other surgical history (08/02/2017); Hernia repair (01/08/2014); and Appendectomy (01/08/2014).     Allergies  Patient has no known allergies.    Social History  He reports that he has never smoked. He has never used smokeless tobacco. He reports current alcohol use of about 2.0 standard drinks of alcohol per week. He reports that he does not use drugs.    Family History  Family History   Problem Relation Name Age of Onset    Diabetes Mother      Other (abestos exposure) Father      Lung cancer Father         Last Recorded Vitals  Blood pressure 138/84, pulse 67, temperature 36.5 °C (97.7 °F), temperature source Temporal, resp. rate 16, height 1.803 m (5' 11\"), weight 106 kg (233 lb), SpO2 98%.    Physical Exam  Well-developed, well-nourished, no acute distress, alert and oriented  Right inferior lateral chest/abdominal wound is clean.  No purulent drainage noted.  No cellulitis.  Nontender.    Relevant Results    Assessment/Plan   Diagnoses and all orders for this visit:  Abdominal wall abscess  Chest wall abscess    Right lateral abdominal wall/chest wall abscess of uncertain etiology.  Improving.  Core biopsy shows acute inflammation.  PET scan consistent with inflammatory process.  Repeat CT abdomen in 2 months.  I called the radiology supervisor to add on the CT abdomen at the same time as the CT chest.  Follow-up after CT scan for reevaluation of wound.       Mook Segovia MD  "

## 2024-08-20 NOTE — TELEPHONE ENCOUNTER
PT WIFE CALLED ASKING IF YOU CAN CALL HER WHEN YOU CAN VONDA WAS IN THE HOSPITAL AND HAD SOME TEST DONE AND SHE WANTED TO SPEAK TO YOU ABUT THEM.

## 2024-08-21 ENCOUNTER — APPOINTMENT (OUTPATIENT)
Dept: SURGERY | Facility: CLINIC | Age: 59
End: 2024-08-21
Payer: COMMERCIAL

## 2024-08-21 VITALS
RESPIRATION RATE: 16 BRPM | BODY MASS INDEX: 32.62 KG/M2 | WEIGHT: 233 LBS | TEMPERATURE: 97.7 F | SYSTOLIC BLOOD PRESSURE: 138 MMHG | DIASTOLIC BLOOD PRESSURE: 84 MMHG | HEIGHT: 71 IN | OXYGEN SATURATION: 98 % | HEART RATE: 67 BPM

## 2024-08-21 DIAGNOSIS — L02.211 ABDOMINAL WALL ABSCESS: Primary | ICD-10-CM

## 2024-08-21 DIAGNOSIS — I10 BENIGN ESSENTIAL HYPERTENSION: ICD-10-CM

## 2024-08-21 DIAGNOSIS — L02.213 CHEST WALL ABSCESS: ICD-10-CM

## 2024-08-21 DIAGNOSIS — E11.9 TYPE 2 DIABETES MELLITUS WITHOUT COMPLICATION, WITHOUT LONG-TERM CURRENT USE OF INSULIN (MULTI): Primary | ICD-10-CM

## 2024-08-21 PROCEDURE — 3075F SYST BP GE 130 - 139MM HG: CPT | Performed by: SURGERY

## 2024-08-21 PROCEDURE — 4010F ACE/ARB THERAPY RXD/TAKEN: CPT | Performed by: SURGERY

## 2024-08-21 PROCEDURE — 3079F DIAST BP 80-89 MM HG: CPT | Performed by: SURGERY

## 2024-08-21 PROCEDURE — 1036F TOBACCO NON-USER: CPT | Performed by: SURGERY

## 2024-08-21 PROCEDURE — 3044F HG A1C LEVEL LT 7.0%: CPT | Performed by: SURGERY

## 2024-08-21 PROCEDURE — 3008F BODY MASS INDEX DOCD: CPT | Performed by: SURGERY

## 2024-08-21 PROCEDURE — 99213 OFFICE O/P EST LOW 20 MIN: CPT | Performed by: SURGERY

## 2024-08-21 RX ORDER — ATORVASTATIN CALCIUM 80 MG/1
80 TABLET, FILM COATED ORAL DAILY
Qty: 90 TABLET | Refills: 3 | Status: SHIPPED | OUTPATIENT
Start: 2024-08-21 | End: 2025-08-21

## 2024-08-21 RX ORDER — NYSTATIN AND TRIAMCINOLONE ACETONIDE 100000; 1 [USP'U]/G; MG/G
CREAM TOPICAL
COMMUNITY
Start: 2024-08-05

## 2024-08-21 RX ORDER — LISINOPRIL 20 MG/1
20 TABLET ORAL DAILY
Qty: 90 TABLET | Refills: 3 | Status: SHIPPED | OUTPATIENT
Start: 2024-08-21

## 2024-08-21 RX ORDER — METFORMIN HYDROCHLORIDE 500 MG/1
500 TABLET ORAL 2 TIMES DAILY
Qty: 180 TABLET | Refills: 3 | Status: SHIPPED | OUTPATIENT
Start: 2024-08-21 | End: 2025-08-21

## 2024-08-21 ASSESSMENT — PAIN SCALES - GENERAL: PAINLEVEL: 0-NO PAIN

## 2024-08-21 NOTE — TELEPHONE ENCOUNTER
Spoke with wife. Pt needs work form filled out, she will drop off. Advised for patient to follow up with me in the office in the next month or two.

## 2024-08-21 NOTE — LETTER
August 21, 2024     Josse Al MD  [address]  [facility] [address]    Patient: Saul Colon   YOB: 1965   Date of Visit: 8/21/2024       Dear Dr. Josse Al MD:    Thank you for referring Saul Colon to me for evaluation. Below are my notes for this consultation.  If you have questions, please do not hesitate to call me. I look forward to following your patient along with you.       Sincerely,     Mook Segovia MD      CC: No Recipients  ______________________________________________________________________________________    History Of Present Illness  HPI    June 21, 2024  The patient is a 59-year-old male who on Mona 10, 2024 had incision and drainage of a right inferior lateral chest wall abscess with local anesthetic at the bedside.  On CT scan the abscess appeared to extend through the right inferior lateral chest wall into the right lateral peritoneal space and pleural space.  The area improved following incision and drainage.  He was treated with IV antibiotics per infectious disease service recommendations.  He was discharged home on June 14, 2024 with Augmentin to complete a 14-day course of antibiotics.  This abscess is in the same location as the perihepatic abscess which was percutaneously drained in 2016.  The site of the incision and drainage was adjacent to the scar from his drain placement.  He was scheduled to have interventional radiology percutaneously biopsy the soft tissue in the region of the chest wall abscess in order to rule out neoplasm.  Culture from the abscess had no growth.  Fungal culture has been negative to date.  AFB culture was discontinued.  The patient reports that he feels much better.  The pain is much improved.  He still has tan fluid draining from the wound.  No fevers or chills.  He continues on antibiotics per infectious disease service.  He is scheduled for a CT chest on July 2 followed by evaluation by   "Christoph from thoracic surgery.  Biopsy by interventional radiology is scheduled July 8.  Colonoscopy is scheduled July 11.  Vascular surgery, Dr. Gotti, evaluated the patient and placed him on aspirin due to mesenteric vessel stenosis.     July 22, 2024  The patient feels \"great\".  He has no pain.  He reports that he completed his antibiotics June 28 and infectious disease service has not recommended additional antibiotics.  He continues to have some drainage from the right chest wound.  He was evaluated by Dr. Hawthorne from thoracic surgery.  He had percutaneous biopsy of the right chest wall mass by interventional radiology on July 8, 2024.  Pathology showed soft tissue with acute inflammation, favor reactive changes.  GMS and AFB were negative for fungal and acid-fast organisms.  Colonoscopy was done on July 11, 2024 by Dr. Taylor.  Repeat CT abdomen was performed on July 2, 2024.    August 21, 2024  No pain.  Minimal drainage from wound.  The patient is following with infectious disease/wound service every 2 weeks.    Past medical history:  Laparoscopic appendectomy in 2013 for a perforated appendix at [facility].  Perihepatic abscess percutaneously drained in October 2016.  Laparotomy with excision of urachal cyst at [facility] in November 2016.  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), Other conditions influencing health status (08/22/2016), Other shoulder lesions, right shoulder (06/29/2016), Pain in right knee (04/04/2022), Personal history of diseases of the blood and blood-forming organs and " "certain disorders involving the immune mechanism (04/17/2017), Personal history of diseases of the skin and subcutaneous tissue (08/16/2017), Personal history of other diseases of the digestive system, Personal history of other diseases of the musculoskeletal system and connective tissue (01/19/2015), Personal history of other diseases of the respiratory system (02/15/2016), Plantar fascial fibromatosis (12/12/2014), Postnasal drip (08/12/2016), Rash and other nonspecific skin eruption (11/08/2022), Umbilical hernia with obstruction, without gangrene (08/17/2016), Unspecified otitis externa, unspecified ear (09/21/2018), and Unspecified rotator cuff tear or rupture of right shoulder, not specified as traumatic (04/17/2017).    Surgical History  He has a past surgical history that includes Colonoscopy (08/02/2017); Vasectomy (08/02/2017); Other surgical history (08/02/2017); Hernia repair (01/08/2014); and Appendectomy (01/08/2014).     Allergies  Patient has no known allergies.    Social History  He reports that he has never smoked. He has never used smokeless tobacco. He reports current alcohol use of about 2.0 standard drinks of alcohol per week. He reports that he does not use drugs.    Family History  Family History   Problem Relation Name Age of Onset   • Diabetes Mother     • Other (abestos exposure) Father     • Lung cancer Father         Last Recorded Vitals  Blood pressure 138/84, pulse 67, temperature 36.5 °C (97.7 °F), temperature source Temporal, resp. rate 16, height 1.803 m (5' 11\"), weight 106 kg (233 lb), SpO2 98%.    Physical Exam  Well-developed, well-nourished, no acute distress, alert and oriented  Right inferior lateral chest/abdominal wound is clean.  No purulent drainage noted.  No cellulitis.  Nontender.    Relevant Results    Assessment/Plan  Diagnoses and all orders for this visit:  Abdominal wall abscess  Chest wall abscess    Right lateral abdominal wall/chest wall abscess of uncertain " etiology.  Improving.  Core biopsy shows acute inflammation.  PET scan consistent with inflammatory process.  Repeat CT abdomen in 2 months.  I called the radiology supervisor to add on the CT abdomen at the same time as the CT chest.  Follow-up after CT scan for reevaluation of wound.       Mook Segovia MD

## 2024-08-28 ENCOUNTER — OFFICE VISIT (OUTPATIENT)
Dept: WOUND CARE | Facility: CLINIC | Age: 59
End: 2024-08-28
Payer: COMMERCIAL

## 2024-08-28 PROCEDURE — 99213 OFFICE O/P EST LOW 20 MIN: CPT

## 2024-09-11 ENCOUNTER — OFFICE VISIT (OUTPATIENT)
Dept: WOUND CARE | Facility: CLINIC | Age: 59
End: 2024-09-11
Payer: COMMERCIAL

## 2024-09-11 PROCEDURE — 99213 OFFICE O/P EST LOW 20 MIN: CPT

## 2024-09-18 ENCOUNTER — OFFICE VISIT (OUTPATIENT)
Dept: WOUND CARE | Facility: CLINIC | Age: 59
End: 2024-09-18
Payer: COMMERCIAL

## 2024-09-18 PROCEDURE — 99213 OFFICE O/P EST LOW 20 MIN: CPT

## 2024-10-02 ENCOUNTER — OFFICE VISIT (OUTPATIENT)
Dept: WOUND CARE | Facility: CLINIC | Age: 59
End: 2024-10-02
Payer: COMMERCIAL

## 2024-10-15 NOTE — PROGRESS NOTES
"Subjective   Saul Colon  is a 59 y.o. male who presents for evaluation of lung nodule.     He noted an abscess on his abdominal wall which led to recent imaging on which also showed a lung nodule. This abdominal abscess may relate back to an appendiceal operation in 2013 that required reoperation in 2016.  There were concerns this could represent a malignant process, and I recommended a PET/CT on 7/15/2024, which suggested a \"infectious or inflammatory process\".  I recommended radiographic surveillance.    Currently the patient is presenting for routine follow-up and in their usual state of health. He denies the following symptoms: chest pain, shortness of breath at rest, shortness of breath with activity, cough, hemoptysis, fevers, chills, and weight loss.      There have been no significant changes to their documented medical, surgical and family history.     He  reports that he has never smoked. He has never used smokeless tobacco. He reports current alcohol use of about 2.0 standard drinks of alcohol per week. He reports that he does not use drugs.    Objective   Physical Exam  The patient is well-appearing and in no acute distress. The trachea is midline and there is no crepitus. The lungs were clear to auscultation grossly. There was good effort and excursion. The heart had a regular rate and rhythm. The abdomen was soft, nontender and nondistended. The extremities had no edema or gross deformities. Mood and affect are appropriate.  Diagnostic Studies  I have reviewed a CT :    Assessment/Plan   I believe that the patient has a lung nodule of unclear etiology.     Based on the patient's clinical presentation and my review of their radiographic imaging, I believe the lung nodule is unlikely to represent a malignant process.  The decreasing size in my mind corroborates the concept that this is infectious or inflammatory in nature.  We discussed various management strategies including surgery, biopsy, and " observation.  Based on this discussion, the patient elected for observational management.  I recommend serial radiographic surveillance.    I recommend CT chest in 6-9 months (based on radiology read)    I discussed this in detail with the patient, including a discussion of alternatives. They were comfortable with this approach.     Tyler Hawthorne MD  [phone]

## 2024-10-16 ENCOUNTER — OFFICE VISIT (OUTPATIENT)
Dept: WOUND CARE | Facility: CLINIC | Age: 59
End: 2024-10-16
Payer: COMMERCIAL

## 2024-10-16 PROCEDURE — 99212 OFFICE O/P EST SF 10 MIN: CPT

## 2024-10-19 DIAGNOSIS — M54.50 CHRONIC BILATERAL LOW BACK PAIN WITHOUT SCIATICA: Primary | ICD-10-CM

## 2024-10-19 DIAGNOSIS — G89.29 CHRONIC BILATERAL LOW BACK PAIN WITHOUT SCIATICA: Primary | ICD-10-CM

## 2024-10-21 RX ORDER — MELOXICAM 15 MG/1
15 TABLET ORAL DAILY
Qty: 90 TABLET | Refills: 3 | Status: SHIPPED | OUTPATIENT
Start: 2024-10-21

## 2024-10-23 ENCOUNTER — APPOINTMENT (OUTPATIENT)
Dept: RADIOLOGY | Facility: CLINIC | Age: 59
End: 2024-10-23
Payer: COMMERCIAL

## 2024-10-23 ENCOUNTER — OFFICE VISIT (OUTPATIENT)
Dept: SURGERY | Facility: CLINIC | Age: 59
End: 2024-10-23
Payer: COMMERCIAL

## 2024-10-23 ENCOUNTER — HOSPITAL ENCOUNTER (OUTPATIENT)
Dept: RADIOLOGY | Facility: HOSPITAL | Age: 59
Discharge: HOME | End: 2024-10-23
Payer: COMMERCIAL

## 2024-10-23 VITALS
BODY MASS INDEX: 32.15 KG/M2 | DIASTOLIC BLOOD PRESSURE: 83 MMHG | OXYGEN SATURATION: 98 % | HEART RATE: 66 BPM | HEIGHT: 72 IN | WEIGHT: 237.4 LBS | SYSTOLIC BLOOD PRESSURE: 147 MMHG | TEMPERATURE: 99.3 F

## 2024-10-23 DIAGNOSIS — R91.1 PULMONARY NODULE 1 CM OR GREATER IN DIAMETER: Primary | ICD-10-CM

## 2024-10-23 DIAGNOSIS — R91.1 LUNG NODULE: ICD-10-CM

## 2024-10-23 DIAGNOSIS — L02.211 ABDOMINAL WALL ABSCESS: ICD-10-CM

## 2024-10-23 DIAGNOSIS — L02.213 CHEST WALL ABSCESS: ICD-10-CM

## 2024-10-23 PROCEDURE — 3008F BODY MASS INDEX DOCD: CPT | Performed by: THORACIC SURGERY (CARDIOTHORACIC VASCULAR SURGERY)

## 2024-10-23 PROCEDURE — 2550000001 HC RX 255 CONTRASTS: Performed by: SURGERY

## 2024-10-23 PROCEDURE — 3079F DIAST BP 80-89 MM HG: CPT | Performed by: THORACIC SURGERY (CARDIOTHORACIC VASCULAR SURGERY)

## 2024-10-23 PROCEDURE — 74150 CT ABDOMEN W/O CONTRAST: CPT

## 2024-10-23 PROCEDURE — A9698 NON-RAD CONTRAST MATERIALNOC: HCPCS | Performed by: SURGERY

## 2024-10-23 PROCEDURE — 99214 OFFICE O/P EST MOD 30 MIN: CPT | Performed by: THORACIC SURGERY (CARDIOTHORACIC VASCULAR SURGERY)

## 2024-10-23 PROCEDURE — 3044F HG A1C LEVEL LT 7.0%: CPT | Performed by: THORACIC SURGERY (CARDIOTHORACIC VASCULAR SURGERY)

## 2024-10-23 PROCEDURE — 71250 CT THORAX DX C-: CPT

## 2024-10-23 PROCEDURE — 4010F ACE/ARB THERAPY RXD/TAKEN: CPT | Performed by: THORACIC SURGERY (CARDIOTHORACIC VASCULAR SURGERY)

## 2024-10-23 PROCEDURE — 3077F SYST BP >= 140 MM HG: CPT | Performed by: THORACIC SURGERY (CARDIOTHORACIC VASCULAR SURGERY)

## 2024-10-23 RX ORDER — FLUCONAZOLE 100 MG/1
TABLET ORAL
COMMUNITY
Start: 2024-08-28 | End: 2024-10-23 | Stop reason: ALTCHOICE

## 2024-10-23 ASSESSMENT — PAIN SCALES - GENERAL: PAINLEVEL_OUTOF10: 0-NO PAIN

## 2024-10-23 ASSESSMENT — ENCOUNTER SYMPTOMS
OCCASIONAL FEELINGS OF UNSTEADINESS: 0
LOSS OF SENSATION IN FEET: 0
DEPRESSION: 0

## 2024-10-28 DIAGNOSIS — R91.1 LUNG NODULE: Primary | ICD-10-CM

## 2024-10-30 ENCOUNTER — APPOINTMENT (OUTPATIENT)
Dept: SURGERY | Facility: CLINIC | Age: 59
End: 2024-10-30
Payer: COMMERCIAL

## 2024-10-30 VITALS
DIASTOLIC BLOOD PRESSURE: 85 MMHG | RESPIRATION RATE: 16 BRPM | BODY MASS INDEX: 32.13 KG/M2 | SYSTOLIC BLOOD PRESSURE: 138 MMHG | TEMPERATURE: 97.3 F | OXYGEN SATURATION: 97 % | WEIGHT: 237.2 LBS | HEART RATE: 69 BPM | HEIGHT: 72 IN

## 2024-10-30 DIAGNOSIS — L02.213 CHEST WALL ABSCESS: ICD-10-CM

## 2024-10-30 DIAGNOSIS — L02.211 ABDOMINAL WALL ABSCESS: Primary | ICD-10-CM

## 2024-10-30 PROCEDURE — 3079F DIAST BP 80-89 MM HG: CPT | Performed by: SURGERY

## 2024-10-30 PROCEDURE — 1036F TOBACCO NON-USER: CPT | Performed by: SURGERY

## 2024-10-30 PROCEDURE — 3008F BODY MASS INDEX DOCD: CPT | Performed by: SURGERY

## 2024-10-30 PROCEDURE — 3044F HG A1C LEVEL LT 7.0%: CPT | Performed by: SURGERY

## 2024-10-30 PROCEDURE — 99213 OFFICE O/P EST LOW 20 MIN: CPT | Performed by: SURGERY

## 2024-10-30 PROCEDURE — 3075F SYST BP GE 130 - 139MM HG: CPT | Performed by: SURGERY

## 2024-10-30 PROCEDURE — 4010F ACE/ARB THERAPY RXD/TAKEN: CPT | Performed by: SURGERY

## 2024-10-30 RX ORDER — DICLOFENAC SODIUM 10 MG/G
4 GEL TOPICAL 4 TIMES DAILY
COMMUNITY

## 2024-10-30 RX ORDER — ASPIRIN 81 MG/1
81 TABLET ORAL DAILY
COMMUNITY

## 2024-10-30 ASSESSMENT — PAIN SCALES - GENERAL: PAINLEVEL_OUTOF10: 0-NO PAIN

## 2024-10-31 ENCOUNTER — OFFICE VISIT (OUTPATIENT)
Dept: WOUND CARE | Facility: CLINIC | Age: 59
End: 2024-10-31
Payer: COMMERCIAL

## 2024-10-31 PROCEDURE — 99213 OFFICE O/P EST LOW 20 MIN: CPT

## 2024-10-31 PROCEDURE — 99213 OFFICE O/P EST LOW 20 MIN: CPT | Performed by: SURGERY

## 2024-11-06 ENCOUNTER — OFFICE VISIT (OUTPATIENT)
Dept: WOUND CARE | Facility: CLINIC | Age: 59
End: 2024-11-06
Payer: COMMERCIAL

## 2024-11-06 ENCOUNTER — LAB REQUISITION (OUTPATIENT)
Dept: LAB | Facility: HOSPITAL | Age: 59
End: 2024-11-06
Payer: COMMERCIAL

## 2024-11-06 DIAGNOSIS — L02.213 CUTANEOUS ABSCESS OF CHEST WALL: ICD-10-CM

## 2024-11-06 PROCEDURE — 99213 OFFICE O/P EST LOW 20 MIN: CPT

## 2024-11-06 PROCEDURE — 87077 CULTURE AEROBIC IDENTIFY: CPT | Mod: OUT,PARLAB | Performed by: INTERNAL MEDICINE

## 2024-11-09 LAB
BACTERIA SPEC CULT: ABNORMAL
BACTERIA SPEC CULT: ABNORMAL
GRAM STN SPEC: ABNORMAL
GRAM STN SPEC: ABNORMAL

## 2024-11-20 ENCOUNTER — OFFICE VISIT (OUTPATIENT)
Dept: WOUND CARE | Facility: CLINIC | Age: 59
End: 2024-11-20
Payer: COMMERCIAL

## 2024-11-20 PROCEDURE — 99213 OFFICE O/P EST LOW 20 MIN: CPT

## 2024-12-04 ENCOUNTER — OFFICE VISIT (OUTPATIENT)
Dept: WOUND CARE | Facility: CLINIC | Age: 59
End: 2024-12-04
Payer: COMMERCIAL

## 2024-12-04 PROCEDURE — 99213 OFFICE O/P EST LOW 20 MIN: CPT

## 2024-12-18 ENCOUNTER — OFFICE VISIT (OUTPATIENT)
Dept: WOUND CARE | Facility: CLINIC | Age: 59
End: 2024-12-18
Payer: COMMERCIAL

## 2024-12-18 PROCEDURE — 99213 OFFICE O/P EST LOW 20 MIN: CPT

## 2025-01-08 ENCOUNTER — TELEPHONE (OUTPATIENT)
Dept: SURGERY | Facility: CLINIC | Age: 60
End: 2025-01-08
Payer: COMMERCIAL

## 2025-01-08 NOTE — PROGRESS NOTES
"History Of Present Illness  HPI     June 21, 2024  The patient is a 59-year-old male who on Mona 10, 2024 had incision and drainage of a right inferior lateral chest wall abscess with local anesthetic at the bedside.  On CT scan the abscess appeared to extend through the right inferior lateral chest wall into the right lateral peritoneal space and pleural space.  The area improved following incision and drainage.  He was treated with IV antibiotics per infectious disease service recommendations.  He was discharged home on June 14, 2024 with Augmentin to complete a 14-day course of antibiotics.  This abscess is in the same location as the perihepatic abscess which was percutaneously drained in 2016.  The site of the incision and drainage was adjacent to the scar from his drain placement.  He was scheduled to have interventional radiology percutaneously biopsy the soft tissue in the region of the chest wall abscess in order to rule out neoplasm.  Culture from the abscess had no growth.  Fungal culture has been negative to date.  AFB culture was discontinued.  The patient reports that he feels much better.  The pain is much improved.  He still has tan fluid draining from the wound.  No fevers or chills.  He continues on antibiotics per infectious disease service.  He is scheduled for a CT chest on July 2 followed by evaluation by Dr. Hawthorne from thoracic surgery.  Biopsy by interventional radiology is scheduled July 8.  Colonoscopy is scheduled July 11.  Vascular surgery, Dr. Gotti, evaluated the patient and placed him on aspirin due to mesenteric vessel stenosis.     July 22, 2024  The patient feels \"great\".  He has no pain.  He reports that he completed his antibiotics June 28 and infectious disease service has not recommended additional antibiotics.  He continues to have some drainage from the right chest wound.  He was evaluated by Dr. Hawthorne from thoracic surgery.  He had percutaneous biopsy of the right chest wall " mass by interventional radiology on July 8, 2024.  Pathology showed soft tissue with acute inflammation, favor reactive changes.  GMS and AFB were negative for fungal and acid-fast organisms.  Colonoscopy was done on July 11, 2024 by Dr. Taylor.  Repeat CT abdomen was performed on July 2, 2024.     October 30, 2024  The patient follows up for his right lateral abdominal wall/chest wall abscess of uncertain etiology after repeat CT abdomen.  Prior PET scan was consistent with an inflammatory process.  Core biopsy showed acute inflammation.  He recently saw Dr. Hawthorne who feels that the lung nodule is unlikely to represent a malignant process.  After discussion the patient agrees to observational management with repeat CT chest in 6 to 9 months.  The patient has no complaints.  No pain.  No drainage at the site of his wound.  No nausea or vomiting.    January 10, 2025  2 weeks ago the patient noted some pink skin in the periumbilical region along the scar from a prior laparotomy done in 2016.  3 days ago the area became tender.  2 days ago it spontaneously drained.  The pain is now gone.    Past medical history:  Laparoscopic appendectomy in 2013 for a perforated appendix at [facility].  Perihepatic abscess percutaneously drained in October 2016.  Laparotomy with excision of urachal cyst at [facility] in November 2016.  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), Other conditions influencing health status (08/22/2016), Other shoulder lesions, right shoulder (06/29/2016), Pain in right knee (04/04/2022),  Personal history of diseases of the blood and blood-forming organs and certain disorders involving the immune mechanism (04/17/2017), Personal history of diseases of the skin and subcutaneous tissue (08/16/2017), Personal history of other diseases of the digestive system, Personal history of other diseases of the musculoskeletal system and connective tissue (01/19/2015), Personal history of other diseases of the respiratory system (02/15/2016), Plantar fascial fibromatosis (12/12/2014), Postnasal drip (08/12/2016), Rash and other nonspecific skin eruption (11/08/2022), Umbilical hernia with obstruction, without gangrene (08/17/2016), Unspecified otitis externa, unspecified ear (09/21/2018), and Unspecified rotator cuff tear or rupture of right shoulder, not specified as traumatic (04/17/2017).    Surgical History  He has a past surgical history that includes Colonoscopy (08/02/2017); Vasectomy (08/02/2017); Other surgical history (08/02/2017); Hernia repair (01/08/2014); and Appendectomy (01/08/2014).     Allergies  Patient has no known allergies.    Social History  He reports that he has never smoked. He has never used smokeless tobacco. He reports current alcohol use of about 2.0 standard drinks of alcohol per week. He reports that he does not use drugs.    Family History  Family History   Problem Relation Name Age of Onset    Diabetes Mother      Other (abestos exposure) Father      Lung cancer Father         Last Recorded Vitals  Blood pressure 152/88, pulse 77, temperature 36.2 °C (97.2 °F), temperature source Temporal, resp. rate 17, height 1.829 m (6'), weight 105 kg (232 lb), SpO2 98%.    Physical Exam  Constitutional: Well-developed, well-nourished, alert and oriented, no acute distress  Skin: Warm and dry, no lesions, no rashes, no jaundice  HEENT: Normocephalic, atraumatic, EOMI, no scleral icterus, eyes have no redness or swelling or discharge, external inspection of ears and nose is normal, mucous  membranes moist  Neck: Soft, nontender, no mass or adenopathy  Cardiac: Regular rate and rhythm, no murmur  Chest: Patent airway, clear to auscultation, normal breath sounds with good chest expansion, no wheezes or rales or rhonchi noted, thorax symmetric  Abdomen: Nondistended, positive bowel sounds, soft, nontender, no mass.  Midline scar with 1.5 cm diameter fluctuance at the superior portion of the scar which is draining a small amount of purulent fluid.  No cellulitis.  Rectal: Not performed  Extremities: No injury, no lower extremity edema or calf tenderness  Lymphatic: No cervical adenopathy  Musculoskeletal: Range of motion intact, no joint swelling, normal strength  Neurological: Alert and oriented x3, intact sensory and motor function, no obvious focal neurologic abnormalities, normal gait  Psychological: Appropriate mood and behavior    Procedure:  Incision and drainage performed with local anesthetic.  The area was prepped with Betadine.  3 mL of 1% plain lidocaine infiltrated.  Midline scar 1 cm in length made directly overlying the abscess.  Purulent fluid drained and cultured.  Sinus tract extended towards the fascia.  No hernia identified.  No foreign body identified.  Patient tolerated the procedure well with minimal bleeding and no immediate complication.  Dressing placed.    Assessment/Plan   Diagnoses and all orders for this visit:  Abscess  -     Tissue/Wound Culture/Smear; Standing  Abdominal wall abscess at site of prior laparotomy.  Status post incision and drainage.  No cellulitis noted.  Does not need antibiotics.  Can take over-the-counter pain medication.  Follow-up in 1 week.      Mook Segovia MD

## 2025-01-08 NOTE — TELEPHONE ENCOUNTER
Patient's wife called and stated that the patient had an abscess around his belly button that looks red and infected, and would like to be seen by Dr. Segovia as soon as possible. First available appointment was 1/15/2025, and it was scheduled, but the patient would probably need to be seen earlier. Please review. Thank you.

## 2025-01-10 ENCOUNTER — OFFICE VISIT (OUTPATIENT)
Dept: SURGERY | Facility: CLINIC | Age: 60
End: 2025-01-10
Payer: COMMERCIAL

## 2025-01-10 VITALS
HEIGHT: 72 IN | DIASTOLIC BLOOD PRESSURE: 88 MMHG | OXYGEN SATURATION: 98 % | SYSTOLIC BLOOD PRESSURE: 152 MMHG | WEIGHT: 232 LBS | TEMPERATURE: 97.2 F | BODY MASS INDEX: 31.42 KG/M2 | RESPIRATION RATE: 17 BRPM | HEART RATE: 77 BPM

## 2025-01-10 DIAGNOSIS — L02.91 ABSCESS: Primary | ICD-10-CM

## 2025-01-10 PROCEDURE — 87070 CULTURE OTHR SPECIMN AEROBIC: CPT | Mod: PARLAB | Performed by: SURGERY

## 2025-01-10 PROCEDURE — 3077F SYST BP >= 140 MM HG: CPT | Performed by: SURGERY

## 2025-01-10 PROCEDURE — 99213 OFFICE O/P EST LOW 20 MIN: CPT | Performed by: SURGERY

## 2025-01-10 PROCEDURE — 87205 SMEAR GRAM STAIN: CPT | Mod: PARLAB | Performed by: SURGERY

## 2025-01-10 PROCEDURE — 3008F BODY MASS INDEX DOCD: CPT | Performed by: SURGERY

## 2025-01-10 PROCEDURE — 4010F ACE/ARB THERAPY RXD/TAKEN: CPT | Performed by: SURGERY

## 2025-01-10 PROCEDURE — 10060 I&D ABSCESS SIMPLE/SINGLE: CPT | Performed by: SURGERY

## 2025-01-10 PROCEDURE — 1036F TOBACCO NON-USER: CPT | Performed by: SURGERY

## 2025-01-10 PROCEDURE — 3079F DIAST BP 80-89 MM HG: CPT | Performed by: SURGERY

## 2025-01-10 ASSESSMENT — PAIN SCALES - GENERAL: PAINLEVEL_OUTOF10: 0-NO PAIN

## 2025-01-10 NOTE — LETTER
January 10, 2025     Josse Al MD  [address]  [facility] [address]    Patient: Saul Colon   YOB: 1965   Date of Visit: 1/10/2025       Dear Dr. Josse Al MD:    Thank you for referring Saul Colon to me for evaluation. Below are my notes for this consultation.  If you have questions, please do not hesitate to call me. I look forward to following your patient along with you.       Sincerely,     Mook Segovia MD      CC: No Recipients  ______________________________________________________________________________________    History Of Present Illness  HPI     June 21, 2024  The patient is a 59-year-old male who on Mona 10, 2024 had incision and drainage of a right inferior lateral chest wall abscess with local anesthetic at the bedside.  On CT scan the abscess appeared to extend through the right inferior lateral chest wall into the right lateral peritoneal space and pleural space.  The area improved following incision and drainage.  He was treated with IV antibiotics per infectious disease service recommendations.  He was discharged home on June 14, 2024 with Augmentin to complete a 14-day course of antibiotics.  This abscess is in the same location as the perihepatic abscess which was percutaneously drained in 2016.  The site of the incision and drainage was adjacent to the scar from his drain placement.  He was scheduled to have interventional radiology percutaneously biopsy the soft tissue in the region of the chest wall abscess in order to rule out neoplasm.  Culture from the abscess had no growth.  Fungal culture has been negative to date.  AFB culture was discontinued.  The patient reports that he feels much better.  The pain is much improved.  He still has tan fluid draining from the wound.  No fevers or chills.  He continues on antibiotics per infectious disease service.  He is scheduled for a CT chest on July 2 followed by evaluation by  "Dr. Hawthorne from thoracic surgery.  Biopsy by interventional radiology is scheduled July 8.  Colonoscopy is scheduled July 11.  Vascular surgery, Dr. Gotti, evaluated the patient and placed him on aspirin due to mesenteric vessel stenosis.     July 22, 2024  The patient feels \"great\".  He has no pain.  He reports that he completed his antibiotics June 28 and infectious disease service has not recommended additional antibiotics.  He continues to have some drainage from the right chest wound.  He was evaluated by Dr. Hawthorne from thoracic surgery.  He had percutaneous biopsy of the right chest wall mass by interventional radiology on July 8, 2024.  Pathology showed soft tissue with acute inflammation, favor reactive changes.  GMS and AFB were negative for fungal and acid-fast organisms.  Colonoscopy was done on July 11, 2024 by Dr. Taylor.  Repeat CT abdomen was performed on July 2, 2024.     October 30, 2024  The patient follows up for his right lateral abdominal wall/chest wall abscess of uncertain etiology after repeat CT abdomen.  Prior PET scan was consistent with an inflammatory process.  Core biopsy showed acute inflammation.  He recently saw Dr. Hawthorne who feels that the lung nodule is unlikely to represent a malignant process.  After discussion the patient agrees to observational management with repeat CT chest in 6 to 9 months.  The patient has no complaints.  No pain.  No drainage at the site of his wound.  No nausea or vomiting.    January 10, 2025  2 weeks ago the patient noted some pink skin in the periumbilical region along the scar from a prior laparotomy done in 2016.  3 days ago the area became tender.  2 days ago it spontaneously drained.  The pain is now gone.    Past medical history:  Laparoscopic appendectomy in 2013 for a perforated appendix at [facility].  Perihepatic abscess percutaneously drained in October 2016.  Laparotomy with excision of urachal cyst at [facility] in November " 2016.  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), Other conditions influencing health status (08/22/2016), Other shoulder lesions, right shoulder (06/29/2016), Pain in right knee (04/04/2022), Personal history of diseases of the blood and blood-forming organs and certain disorders involving the immune mechanism (04/17/2017), Personal history of diseases of the skin and subcutaneous tissue (08/16/2017), Personal history of other diseases of the digestive system, Personal history of other diseases of the musculoskeletal system and connective tissue (01/19/2015), Personal history of other diseases of the respiratory system (02/15/2016), Plantar fascial fibromatosis (12/12/2014), Postnasal drip (08/12/2016), Rash and other nonspecific skin eruption (11/08/2022), Umbilical hernia with obstruction, without gangrene (08/17/2016), Unspecified otitis externa, unspecified ear (09/21/2018), and Unspecified rotator cuff tear or rupture of right shoulder, not specified as traumatic (04/17/2017).    Surgical History  He has a past surgical history that includes Colonoscopy (08/02/2017); Vasectomy (08/02/2017); Other surgical history (08/02/2017); Hernia repair (01/08/2014); and Appendectomy (01/08/2014).     Allergies  Patient has no known allergies.    Social History  He reports that he has never smoked. He has never used smokeless tobacco. He reports current alcohol use of about 2.0 standard drinks of alcohol per week. He reports that he does not use drugs.    Family History  Family History   Problem Relation  Name Age of Onset   • Diabetes Mother     • Other (abestos exposure) Father     • Lung cancer Father         Last Recorded Vitals  Blood pressure 152/88, pulse 77, temperature 36.2 °C (97.2 °F), temperature source Temporal, resp. rate 17, height 1.829 m (6'), weight 105 kg (232 lb), SpO2 98%.    Physical Exam  Constitutional: Well-developed, well-nourished, alert and oriented, no acute distress  Skin: Warm and dry, no lesions, no rashes, no jaundice  HEENT: Normocephalic, atraumatic, EOMI, no scleral icterus, eyes have no redness or swelling or discharge, external inspection of ears and nose is normal, mucous membranes moist  Neck: Soft, nontender, no mass or adenopathy  Cardiac: Regular rate and rhythm, no murmur  Chest: Patent airway, clear to auscultation, normal breath sounds with good chest expansion, no wheezes or rales or rhonchi noted, thorax symmetric  Abdomen: Nondistended, positive bowel sounds, soft, nontender, no mass.  Midline scar with 1.5 cm diameter fluctuance at the superior portion of the scar which is draining a small amount of purulent fluid.  No cellulitis.  Rectal: Not performed  Extremities: No injury, no lower extremity edema or calf tenderness  Lymphatic: No cervical adenopathy  Musculoskeletal: Range of motion intact, no joint swelling, normal strength  Neurological: Alert and oriented x3, intact sensory and motor function, no obvious focal neurologic abnormalities, normal gait  Psychological: Appropriate mood and behavior    Procedure:  Incision and drainage performed with local anesthetic.  The area was prepped with Betadine.  3 mL of 1% plain lidocaine infiltrated.  Midline scar 1 cm in length made directly overlying the abscess.  Purulent fluid drained and cultured.  Sinus tract extended towards the fascia.  No hernia identified.  No foreign body identified.  Patient tolerated the procedure well with minimal bleeding and no immediate complication.  Dressing placed.    Assessment/Plan    Diagnoses and all orders for this visit:  Abscess  -     Tissue/Wound Culture/Smear; Standing  Abdominal wall abscess at site of prior laparotomy.  Status post incision and drainage.  No cellulitis noted.  Does not need antibiotics.  Can take over-the-counter pain medication.  Follow-up in 1 week.      Mook Segovia MD

## 2025-01-12 LAB
BACTERIA SPEC CULT: NORMAL
GRAM STN SPEC: NORMAL
GRAM STN SPEC: NORMAL

## 2025-01-15 ENCOUNTER — APPOINTMENT (OUTPATIENT)
Dept: SURGERY | Facility: CLINIC | Age: 60
End: 2025-01-15
Payer: COMMERCIAL

## 2025-01-16 ENCOUNTER — OFFICE VISIT (OUTPATIENT)
Dept: WOUND CARE | Facility: CLINIC | Age: 60
End: 2025-01-16
Payer: COMMERCIAL

## 2025-01-16 PROCEDURE — 99212 OFFICE O/P EST SF 10 MIN: CPT

## 2025-01-16 PROCEDURE — 99213 OFFICE O/P EST LOW 20 MIN: CPT | Performed by: SURGERY

## 2025-01-21 NOTE — PROGRESS NOTES
"History Of Present Illness  HPI      June 21, 2024  The patient is a 59-year-old male who on Mona 10, 2024 had incision and drainage of a right inferior lateral chest wall abscess with local anesthetic at the bedside.  On CT scan the abscess appeared to extend through the right inferior lateral chest wall into the right lateral peritoneal space and pleural space.  The area improved following incision and drainage.  He was treated with IV antibiotics per infectious disease service recommendations.  He was discharged home on June 14, 2024 with Augmentin to complete a 14-day course of antibiotics.  This abscess is in the same location as the perihepatic abscess which was percutaneously drained in 2016.  The site of the incision and drainage was adjacent to the scar from his drain placement.  He was scheduled to have interventional radiology percutaneously biopsy the soft tissue in the region of the chest wall abscess in order to rule out neoplasm.  Culture from the abscess had no growth.  Fungal culture has been negative to date.  AFB culture was discontinued.  The patient reports that he feels much better.  The pain is much improved.  He still has tan fluid draining from the wound.  No fevers or chills.  He continues on antibiotics per infectious disease service.  He is scheduled for a CT chest on July 2 followed by evaluation by Dr. Hawthorne from thoracic surgery.  Biopsy by interventional radiology is scheduled July 8.  Colonoscopy is scheduled July 11.  Vascular surgery, Dr. Gotti, evaluated the patient and placed him on aspirin due to mesenteric vessel stenosis.     July 22, 2024  The patient feels \"great\".  He has no pain.  He reports that he completed his antibiotics June 28 and infectious disease service has not recommended additional antibiotics.  He continues to have some drainage from the right chest wound.  He was evaluated by Dr. Hawthorne from thoracic surgery.  He had percutaneous biopsy of the right chest wall " mass by interventional radiology on July 8, 2024.  Pathology showed soft tissue with acute inflammation, favor reactive changes.  GMS and AFB were negative for fungal and acid-fast organisms.  Colonoscopy was done on July 11, 2024 by Dr. Taylor.  Repeat CT abdomen was performed on July 2, 2024.     October 30, 2024  The patient follows up for his right lateral abdominal wall/chest wall abscess of uncertain etiology after repeat CT abdomen.  Prior PET scan was consistent with an inflammatory process.  Core biopsy showed acute inflammation.  He recently saw Dr. Hawthorne who feels that the lung nodule is unlikely to represent a malignant process.  After discussion the patient agrees to observational management with repeat CT chest in 6 to 9 months.  The patient has no complaints.  No pain.  No drainage at the site of his wound.  No nausea or vomiting.     January 10, 2025  2 weeks ago the patient noted some pink skin in the periumbilical region along the scar from a prior laparotomy done in 2016.  3 days ago the area became tender.  2 days ago it spontaneously drained.  The pain is now gone.     January 22, 2025  Status post incision and drainage on January 10, 2025 of an abdominal wall abscess in the periumbilical region at the site of prior laparotomy.  No foreign body was identified.  Purulent fluid was drained and cultured.  Culture grew rare mixed skin microorganisms.  No complaints.  The wound is still draining.  No pain.    Past medical history:  Laparoscopic appendectomy in 2013 for a perforated appendix at [facility].  Perihepatic abscess percutaneously drained in October 2016.  Laparotomy with excision of urachal cyst at [facility] in November 2016.  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), Other conditions influencing health status (08/22/2016), Other shoulder lesions, right shoulder (06/29/2016), Pain in right knee (04/04/2022), Personal history of diseases of the blood and blood-forming organs and certain disorders involving the immune mechanism (04/17/2017), Personal history of diseases of the skin and subcutaneous tissue (08/16/2017), Personal history of other diseases of the digestive system, Personal history of other diseases of the musculoskeletal system and connective tissue (01/19/2015), Personal history of other diseases of the respiratory system (02/15/2016), Plantar fascial fibromatosis (12/12/2014), Postnasal drip (08/12/2016), Rash and other nonspecific skin eruption (11/08/2022), Umbilical hernia with obstruction, without gangrene (08/17/2016), Unspecified otitis externa, unspecified ear (09/21/2018), and Unspecified rotator cuff tear or rupture of right shoulder, not specified as traumatic (04/17/2017).    Surgical History  He has a past surgical history that includes Colonoscopy (08/02/2017); Vasectomy (08/02/2017); Other surgical history (08/02/2017); Hernia repair (01/08/2014); and Appendectomy (01/08/2014).     Allergies  Patient has no known allergies.    Social History  He reports that he has never smoked. He has never used smokeless tobacco. He reports current alcohol use of about 2.0 standard drinks of alcohol per week. He reports that he does not use drugs.    Family History  Family History   Problem Relation Name Age of Onset    Diabetes Mother      Other (abestos exposure) Father      Lung cancer Father       Last Recorded Vitals  Blood pressure 142/77, pulse 79, temperature 36.3 °C (97.4 °F), temperature source Temporal, resp. rate 17, height 1.829 m (6'), weight 106 kg (234  lb 6.4 oz), SpO2 98%.    Physical Exam  Well-developed, well-nourished, no acute distress, alert and oriented  Abdomen: Wound is granulating well and now superficial.  No erythema, nontender, minimal drainage of serous fluid.    Relevant Results       Assessment/Plan   Diagnoses and all orders for this visit:  Open wound of anterior abdominal wall, subsequent encounter  Recovering well.  Continue local wound care.  Follow-up as scheduled after CT scan in July.      Mook Segovia MD

## 2025-01-22 ENCOUNTER — APPOINTMENT (OUTPATIENT)
Dept: SURGERY | Facility: CLINIC | Age: 60
End: 2025-01-22
Payer: COMMERCIAL

## 2025-01-22 VITALS
OXYGEN SATURATION: 98 % | HEART RATE: 79 BPM | SYSTOLIC BLOOD PRESSURE: 142 MMHG | TEMPERATURE: 97.4 F | RESPIRATION RATE: 17 BRPM | DIASTOLIC BLOOD PRESSURE: 77 MMHG | BODY MASS INDEX: 31.75 KG/M2 | HEIGHT: 72 IN | WEIGHT: 234.4 LBS

## 2025-01-22 DIAGNOSIS — S31.109D OPEN WOUND OF ANTERIOR ABDOMINAL WALL, SUBSEQUENT ENCOUNTER: Primary | ICD-10-CM

## 2025-01-22 PROBLEM — S31.109A OPEN WND ANTERIOR ABDOMEN: Status: ACTIVE | Noted: 2025-01-22

## 2025-01-22 PROCEDURE — 4010F ACE/ARB THERAPY RXD/TAKEN: CPT | Performed by: SURGERY

## 2025-01-22 PROCEDURE — 3008F BODY MASS INDEX DOCD: CPT | Performed by: SURGERY

## 2025-01-22 PROCEDURE — 3077F SYST BP >= 140 MM HG: CPT | Performed by: SURGERY

## 2025-01-22 PROCEDURE — 1036F TOBACCO NON-USER: CPT | Performed by: SURGERY

## 2025-01-22 PROCEDURE — 3078F DIAST BP <80 MM HG: CPT | Performed by: SURGERY

## 2025-01-22 PROCEDURE — 99212 OFFICE O/P EST SF 10 MIN: CPT | Performed by: SURGERY

## 2025-01-22 ASSESSMENT — PAIN SCALES - GENERAL: PAINLEVEL_OUTOF10: 0-NO PAIN

## 2025-01-22 NOTE — LETTER
January 22, 2025     Josse Al MD  [address]  [facility] [address]    Patient: Saul Colon   YOB: 1965   Date of Visit: 1/22/2025       Dear Dr. Josse Al MD:    Thank you for referring Saul Colon to me for evaluation. Below are my notes for this consultation.  If you have questions, please do not hesitate to call me. I look forward to following your patient along with you.       Sincerely,     Mook Segovia MD      CC: No Recipients  ______________________________________________________________________________________    History Of Present Illness  HPI      June 21, 2024  The patient is a 59-year-old male who on Mona 10, 2024 had incision and drainage of a right inferior lateral chest wall abscess with local anesthetic at the bedside.  On CT scan the abscess appeared to extend through the right inferior lateral chest wall into the right lateral peritoneal space and pleural space.  The area improved following incision and drainage.  He was treated with IV antibiotics per infectious disease service recommendations.  He was discharged home on June 14, 2024 with Augmentin to complete a 14-day course of antibiotics.  This abscess is in the same location as the perihepatic abscess which was percutaneously drained in 2016.  The site of the incision and drainage was adjacent to the scar from his drain placement.  He was scheduled to have interventional radiology percutaneously biopsy the soft tissue in the region of the chest wall abscess in order to rule out neoplasm.  Culture from the abscess had no growth.  Fungal culture has been negative to date.  AFB culture was discontinued.  The patient reports that he feels much better.  The pain is much improved.  He still has tan fluid draining from the wound.  No fevers or chills.  He continues on antibiotics per infectious disease service.  He is scheduled for a CT chest on July 2 followed by evaluation by  "Dr. Hawthorne from thoracic surgery.  Biopsy by interventional radiology is scheduled July 8.  Colonoscopy is scheduled July 11.  Vascular surgery, Dr. Gotti, evaluated the patient and placed him on aspirin due to mesenteric vessel stenosis.     July 22, 2024  The patient feels \"great\".  He has no pain.  He reports that he completed his antibiotics June 28 and infectious disease service has not recommended additional antibiotics.  He continues to have some drainage from the right chest wound.  He was evaluated by Dr. Hawthorne from thoracic surgery.  He had percutaneous biopsy of the right chest wall mass by interventional radiology on July 8, 2024.  Pathology showed soft tissue with acute inflammation, favor reactive changes.  GMS and AFB were negative for fungal and acid-fast organisms.  Colonoscopy was done on July 11, 2024 by Dr. Taylor.  Repeat CT abdomen was performed on July 2, 2024.     October 30, 2024  The patient follows up for his right lateral abdominal wall/chest wall abscess of uncertain etiology after repeat CT abdomen.  Prior PET scan was consistent with an inflammatory process.  Core biopsy showed acute inflammation.  He recently saw Dr. Hawthorne who feels that the lung nodule is unlikely to represent a malignant process.  After discussion the patient agrees to observational management with repeat CT chest in 6 to 9 months.  The patient has no complaints.  No pain.  No drainage at the site of his wound.  No nausea or vomiting.     January 10, 2025  2 weeks ago the patient noted some pink skin in the periumbilical region along the scar from a prior laparotomy done in 2016.  3 days ago the area became tender.  2 days ago it spontaneously drained.  The pain is now gone.     January 22, 2025  Status post incision and drainage on January 10, 2025 of an abdominal wall abscess in the periumbilical region at the site of prior laparotomy.  No foreign body was identified.  Purulent fluid was drained and cultured.  Culture " grew rare mixed skin microorganisms.  No complaints.  The wound is still draining.  No pain.    Past medical history:  Laparoscopic appendectomy in 2013 for a perforated appendix at [facility].  Perihepatic abscess percutaneously drained in October 2016.  Laparotomy with excision of urachal cyst at [facility] in November 2016.  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), Other conditions influencing health status (08/22/2016), Other shoulder lesions, right shoulder (06/29/2016), Pain in right knee (04/04/2022), Personal history of diseases of the blood and blood-forming organs and certain disorders involving the immune mechanism (04/17/2017), Personal history of diseases of the skin and subcutaneous tissue (08/16/2017), Personal history of other diseases of the digestive system, Personal history of other diseases of the musculoskeletal system and connective tissue (01/19/2015), Personal history of other diseases of the respiratory system (02/15/2016), Plantar fascial fibromatosis (12/12/2014), Postnasal drip (08/12/2016), Rash and other nonspecific skin eruption (11/08/2022), Umbilical hernia with obstruction, without gangrene (08/17/2016), Unspecified otitis externa, unspecified ear (09/21/2018), and Unspecified rotator cuff tear or rupture of right shoulder, not specified as traumatic (04/17/2017).    Surgical History  He has a past surgical history that includes Colonoscopy (08/02/2017); Vasectomy (08/02/2017); Other surgical history (08/02/2017); Hernia repair (01/08/2014); and Appendectomy  (01/08/2014).     Allergies  Patient has no known allergies.    Social History  He reports that he has never smoked. He has never used smokeless tobacco. He reports current alcohol use of about 2.0 standard drinks of alcohol per week. He reports that he does not use drugs.    Family History  Family History   Problem Relation Name Age of Onset   • Diabetes Mother     • Other (abestos exposure) Father     • Lung cancer Father       Last Recorded Vitals  Blood pressure 142/77, pulse 79, temperature 36.3 °C (97.4 °F), temperature source Temporal, resp. rate 17, height 1.829 m (6'), weight 106 kg (234 lb 6.4 oz), SpO2 98%.    Physical Exam  Well-developed, well-nourished, no acute distress, alert and oriented  Abdomen: Wound is granulating well and now superficial.  No erythema, nontender, minimal drainage of serous fluid.    Relevant Results       Assessment/Plan  Diagnoses and all orders for this visit:  Open wound of anterior abdominal wall, subsequent encounter  Recovering well.  Continue local wound care.  Follow-up as scheduled after CT scan in July.      Mook Segovia MD

## 2025-06-25 ENCOUNTER — OFFICE VISIT (OUTPATIENT)
Dept: WOUND CARE | Facility: CLINIC | Age: 60
End: 2025-06-25
Payer: COMMERCIAL

## 2025-06-25 PROCEDURE — 11042 DBRDMT SUBQ TIS 1ST 20SQCM/<: CPT

## 2025-06-30 ENCOUNTER — OFFICE VISIT (OUTPATIENT)
Dept: WOUND CARE | Facility: CLINIC | Age: 60
End: 2025-06-30
Payer: COMMERCIAL

## 2025-06-30 PROCEDURE — 11042 DBRDMT SUBQ TIS 1ST 20SQCM/<: CPT

## 2025-07-07 ENCOUNTER — OFFICE VISIT (OUTPATIENT)
Dept: WOUND CARE | Facility: CLINIC | Age: 60
End: 2025-07-07
Payer: COMMERCIAL

## 2025-07-07 DIAGNOSIS — T14.8XXA SKIN WOUND FROM SURGICAL INCISION: Primary | ICD-10-CM

## 2025-07-07 PROCEDURE — 11042 DBRDMT SUBQ TIS 1ST 20SQCM/<: CPT

## 2025-07-07 NOTE — PROGRESS NOTES
Subjective   Patient ID: Saul Colon is a 60 y.o. male who presents for No chief complaint on file..    HPI    Review of Systems    Objective   Physical Exam    Assessment/Plan   Wound Assessment:  Wound 06/10/24 Incision Flank Right;Upper (Active)

## 2025-07-10 ENCOUNTER — APPOINTMENT (OUTPATIENT)
Dept: PRIMARY CARE | Facility: CLINIC | Age: 60
End: 2025-07-10
Payer: COMMERCIAL

## 2025-07-10 ENCOUNTER — HOSPITAL ENCOUNTER (OUTPATIENT)
Dept: RADIOLOGY | Facility: CLINIC | Age: 60
Discharge: HOME | End: 2025-07-10
Payer: COMMERCIAL

## 2025-07-10 VITALS
OXYGEN SATURATION: 94 % | HEART RATE: 112 BPM | SYSTOLIC BLOOD PRESSURE: 123 MMHG | WEIGHT: 234 LBS | DIASTOLIC BLOOD PRESSURE: 84 MMHG | HEIGHT: 72 IN | BODY MASS INDEX: 31.69 KG/M2

## 2025-07-10 DIAGNOSIS — R73.9 HYPERGLYCEMIA: ICD-10-CM

## 2025-07-10 DIAGNOSIS — G89.29 CHRONIC PAIN OF LEFT KNEE: ICD-10-CM

## 2025-07-10 DIAGNOSIS — Z13.6 SCREENING FOR CARDIOVASCULAR CONDITION: ICD-10-CM

## 2025-07-10 DIAGNOSIS — G89.29 CHRONIC PAIN OF LEFT KNEE: Primary | ICD-10-CM

## 2025-07-10 DIAGNOSIS — E11.9 TYPE 2 DIABETES MELLITUS WITHOUT COMPLICATION, WITHOUT LONG-TERM CURRENT USE OF INSULIN: ICD-10-CM

## 2025-07-10 DIAGNOSIS — M25.562 CHRONIC PAIN OF LEFT KNEE: Primary | ICD-10-CM

## 2025-07-10 DIAGNOSIS — Z12.5 SCREENING FOR PROSTATE CANCER: ICD-10-CM

## 2025-07-10 DIAGNOSIS — M25.562 CHRONIC PAIN OF LEFT KNEE: ICD-10-CM

## 2025-07-10 DIAGNOSIS — I10 BENIGN ESSENTIAL HYPERTENSION: ICD-10-CM

## 2025-07-10 PROCEDURE — 3008F BODY MASS INDEX DOCD: CPT | Performed by: INTERNAL MEDICINE

## 2025-07-10 PROCEDURE — 1036F TOBACCO NON-USER: CPT | Performed by: INTERNAL MEDICINE

## 2025-07-10 PROCEDURE — 4010F ACE/ARB THERAPY RXD/TAKEN: CPT | Performed by: INTERNAL MEDICINE

## 2025-07-10 PROCEDURE — 3079F DIAST BP 80-89 MM HG: CPT | Performed by: INTERNAL MEDICINE

## 2025-07-10 PROCEDURE — 73562 X-RAY EXAM OF KNEE 3: CPT | Mod: LT

## 2025-07-10 PROCEDURE — 3074F SYST BP LT 130 MM HG: CPT | Performed by: INTERNAL MEDICINE

## 2025-07-10 PROCEDURE — 99214 OFFICE O/P EST MOD 30 MIN: CPT | Performed by: INTERNAL MEDICINE

## 2025-07-10 RX ORDER — LISINOPRIL 20 MG/1
20 TABLET ORAL DAILY
Qty: 90 TABLET | Refills: 3 | Status: SHIPPED | OUTPATIENT
Start: 2025-07-10

## 2025-07-10 RX ORDER — NYSTATIN AND TRIAMCINOLONE ACETONIDE 100000; 1 [USP'U]/G; MG/G
CREAM TOPICAL
COMMUNITY
Start: 2025-07-01

## 2025-07-10 RX ORDER — DICLOFENAC SODIUM 75 MG/1
75 TABLET, DELAYED RELEASE ORAL 2 TIMES DAILY PRN
Qty: 60 TABLET | Refills: 1 | Status: SHIPPED | OUTPATIENT
Start: 2025-07-10 | End: 2025-09-08

## 2025-07-10 RX ORDER — CLOTRIMAZOLE AND BETAMETHASONE DIPROPIONATE 10; .64 MG/G; MG/G
CREAM TOPICAL
COMMUNITY
Start: 2024-08-28

## 2025-07-10 SDOH — ECONOMIC STABILITY: FOOD INSECURITY: WITHIN THE PAST 12 MONTHS, YOU WORRIED THAT YOUR FOOD WOULD RUN OUT BEFORE YOU GOT MONEY TO BUY MORE.: NEVER TRUE

## 2025-07-10 SDOH — ECONOMIC STABILITY: FOOD INSECURITY: WITHIN THE PAST 12 MONTHS, THE FOOD YOU BOUGHT JUST DIDN'T LAST AND YOU DIDN'T HAVE MONEY TO GET MORE.: NEVER TRUE

## 2025-07-10 ASSESSMENT — PAIN SCALES - GENERAL: PAINLEVEL_OUTOF10: 4

## 2025-07-10 NOTE — PROGRESS NOTES
Subjective   Patient ID: Saul Colon is a 60 y.o. male who presents for Knee Pain (Pain for about 1 month, has not fallen, hit it).    HPI   Had R abdominal wall abscess last year  The last few weeks, wound opened and he is back in Wound Care.  Having CT on 7/23.    A month ago, while sleeping, moved and heard a pop in the L knee. The next morning when waking up had more pain. Does have chronic pain but pain worsened.  Tried ice, heat, tens unit.  Pain can be bilateral joint lines or inferior. Denies instability, but if walks on uneven surface and causes more pain and sometimes can feel unstable. Taking ibuprofen and tylenol.  Knee seems like it can swell at times.  No specific injury.  Meloxicam didn't help but wasn't taking regularly      Review of Systems    Objective   /84 (BP Location: Right arm, Patient Position: Sitting)   Pulse (!) 112   Ht 1.829 m (6')   Wt 106 kg (234 lb)   SpO2 94%   BMI 31.74 kg/m²     Physical Exam  RRR no murmur  CTAB  L knee + crepitus, +bilateral joint line tenderness, no effusion, no warmth, no laxity.    Assessment/Plan   Problem List Items Addressed This Visit           ICD-10-CM    Benign essential hypertension I10    Relevant Medications    lisinopril 20 mg tablet    Other Relevant Orders    CBC    Comprehensive Metabolic Panel    Type 2 diabetes mellitus without complication E11.9    Relevant Orders    Albumin-Creatinine Ratio, Urine Random    Knee pain - Primary M25.569    Relevant Medications    diclofenac (Voltaren) 75 mg EC tablet    Other Relevant Orders    XR knee left 4+ views     Other Visit Diagnoses         Codes      Screening for cardiovascular condition     Z13.6    Relevant Orders    Lipid Panel      Screening for prostate cancer     Z12.5    Relevant Orders    Prostate Specific Antigen, Screen      Hyperglycemia     R73.9    Relevant Orders    Hemoglobin A1C               Chronic L knee pain   -Xray  -Start diclofenac    Follow up in 1-2 months  for CPE. Labs ordered for prior.

## 2025-07-12 DIAGNOSIS — M17.12 PRIMARY OSTEOARTHRITIS OF LEFT KNEE: Primary | ICD-10-CM

## 2025-07-13 LAB
BACTERIA SPEC AEROBE CULT: NORMAL
BACTERIA SPEC ANAEROBE CULT: NORMAL

## 2025-07-14 ENCOUNTER — OFFICE VISIT (OUTPATIENT)
Dept: WOUND CARE | Facility: CLINIC | Age: 60
End: 2025-07-14
Payer: COMMERCIAL

## 2025-07-14 ENCOUNTER — TELEPHONE (OUTPATIENT)
Dept: PRIMARY CARE | Facility: CLINIC | Age: 60
End: 2025-07-14
Payer: COMMERCIAL

## 2025-07-14 PROCEDURE — 11042 DBRDMT SUBQ TIS 1ST 20SQCM/<: CPT

## 2025-07-14 NOTE — TELEPHONE ENCOUNTER
----- Message from Josse Al sent at 7/12/2025  6:05 AM EDT -----  Xray shows advances arthritis in the knee, and also some fluid in the knee joint. I recommend seeing Ortho, they can possibly remove some of the fluid or inject the knee which would help. I placed a   referral and recommend Dr Medina or Brian.  ----- Message -----  From: Interface, Radiology Results In  Sent: 7/11/2025   8:14 PM EDT  To: Josse Al MD

## 2025-07-14 NOTE — TELEPHONE ENCOUNTER
Spoke to patients wife to give results : per Dr Al Xray shows advances arthritis in the knee, and also some fluid in the knee joint. I recommend seeing Ortho, they can possibly remove some of the fluid or inject the knee which would help. I placed a   referral and recommend Dr Medina or Brian.     Aylin wrote down everything I said and will get an appointment set up

## 2025-07-15 NOTE — PROGRESS NOTES
"Subjective   Saul Colon  is a 60 y.o. male who presents for evaluation of lung nodule.     He noted an abscess on his abdominal wall which led to recent imaging on which also showed a lung nodule. This abdominal abscess may relate back to an appendiceal operation in 2013 that required reoperation in 2016.  There were concerns this could represent a malignant process, and I recommended a PET/CT on 7/15/2024, which suggested a \"infectious or inflammatory process\".  I recommended radiographic surveillance.    Currently the patient is presenting for routine follow-up and in their usual state of health. \"I feel fine\" He denies the following symptoms: chest pain, shortness of breath at rest, shortness of breath with activity, cough, hemoptysis, fevers, chills, and weight loss.      There have been no significant changes to their documented medical, surgical and family history.     He  reports that he has never smoked. He has never been exposed to tobacco smoke. He has never used smokeless tobacco. He reports current alcohol use of about 2.0 standard drinks of alcohol per week. He reports that he does not use drugs.    Objective   Physical Exam  The patient is well-appearing and in no acute distress. The trachea is midline and there is no crepitus. The lungs were clear to auscultation grossly. There was good effort and excursion. The heart had a regular rate and rhythm. The abdomen was soft, nontender and nondistended. The extremities had no edema or gross deformities. Mood and affect are appropriate.  Diagnostic Studies  I reviewed a CT chest performed recently. I do not see any new or concerning nodules or adenopathy    Assessment/Plan   I believe that the patient is doing well.     Based on the patient's clinical presentation and my review of their radiographic imaging, I believe the lung nodule is unlikely to represent a malignant process.  We discussed various management strategies including surgery, biopsy, and " observation.  Based on this discussion, the patient elected for observational management.  I recommend serial radiographic surveillance.    I recommend CT chest in 12 months    I discussed this in detail with the patient, including a discussion of alternatives. They were comfortable with this approach.     Tyler Hawthorne MD  [phone]

## 2025-07-23 ENCOUNTER — HOSPITAL ENCOUNTER (OUTPATIENT)
Dept: RADIOLOGY | Facility: CLINIC | Age: 60
Discharge: HOME | End: 2025-07-23
Payer: COMMERCIAL

## 2025-07-23 ENCOUNTER — OFFICE VISIT (OUTPATIENT)
Dept: SURGERY | Facility: CLINIC | Age: 60
End: 2025-07-23
Payer: COMMERCIAL

## 2025-07-23 VITALS
OXYGEN SATURATION: 98 % | TEMPERATURE: 97.5 F | HEIGHT: 71 IN | SYSTOLIC BLOOD PRESSURE: 131 MMHG | DIASTOLIC BLOOD PRESSURE: 83 MMHG | WEIGHT: 237.2 LBS | HEART RATE: 69 BPM | BODY MASS INDEX: 33.21 KG/M2

## 2025-07-23 DIAGNOSIS — L02.211 ABDOMINAL WALL ABSCESS: ICD-10-CM

## 2025-07-23 DIAGNOSIS — R91.1 PULMONARY NODULE 1 CM OR GREATER IN DIAMETER: Primary | ICD-10-CM

## 2025-07-23 DIAGNOSIS — R91.1 LUNG NODULE: ICD-10-CM

## 2025-07-23 PROCEDURE — 71250 CT THORAX DX C-: CPT | Performed by: STUDENT IN AN ORGANIZED HEALTH CARE EDUCATION/TRAINING PROGRAM

## 2025-07-23 PROCEDURE — 3079F DIAST BP 80-89 MM HG: CPT | Performed by: THORACIC SURGERY (CARDIOTHORACIC VASCULAR SURGERY)

## 2025-07-23 PROCEDURE — 74150 CT ABDOMEN W/O CONTRAST: CPT

## 2025-07-23 PROCEDURE — 1036F TOBACCO NON-USER: CPT | Performed by: THORACIC SURGERY (CARDIOTHORACIC VASCULAR SURGERY)

## 2025-07-23 PROCEDURE — 3008F BODY MASS INDEX DOCD: CPT | Performed by: THORACIC SURGERY (CARDIOTHORACIC VASCULAR SURGERY)

## 2025-07-23 PROCEDURE — 2550000001 HC RX 255 CONTRASTS: Performed by: SURGERY

## 2025-07-23 PROCEDURE — 99214 OFFICE O/P EST MOD 30 MIN: CPT | Performed by: THORACIC SURGERY (CARDIOTHORACIC VASCULAR SURGERY)

## 2025-07-23 PROCEDURE — 71250 CT THORAX DX C-: CPT

## 2025-07-23 PROCEDURE — 3075F SYST BP GE 130 - 139MM HG: CPT | Performed by: THORACIC SURGERY (CARDIOTHORACIC VASCULAR SURGERY)

## 2025-07-23 PROCEDURE — 74150 CT ABDOMEN W/O CONTRAST: CPT | Performed by: STUDENT IN AN ORGANIZED HEALTH CARE EDUCATION/TRAINING PROGRAM

## 2025-07-23 PROCEDURE — 99214 OFFICE O/P EST MOD 30 MIN: CPT | Mod: 25 | Performed by: THORACIC SURGERY (CARDIOTHORACIC VASCULAR SURGERY)

## 2025-07-23 PROCEDURE — 4010F ACE/ARB THERAPY RXD/TAKEN: CPT | Performed by: THORACIC SURGERY (CARDIOTHORACIC VASCULAR SURGERY)

## 2025-07-23 PROCEDURE — A9698 NON-RAD CONTRAST MATERIALNOC: HCPCS | Performed by: SURGERY

## 2025-07-23 RX ADMIN — IOHEXOL 500 ML: 12 SOLUTION ORAL at 09:26

## 2025-07-23 ASSESSMENT — LIFESTYLE VARIABLES
AUDIT-C TOTAL SCORE: 3
HOW OFTEN DO YOU HAVE A DRINK CONTAINING ALCOHOL: 2-3 TIMES A WEEK
HOW MANY STANDARD DRINKS CONTAINING ALCOHOL DO YOU HAVE ON A TYPICAL DAY: 1 OR 2
SKIP TO QUESTIONS 9-10: 1
HOW OFTEN DO YOU HAVE SIX OR MORE DRINKS ON ONE OCCASION: NEVER

## 2025-07-23 ASSESSMENT — ENCOUNTER SYMPTOMS
OCCASIONAL FEELINGS OF UNSTEADINESS: 0
DEPRESSION: 0
LOSS OF SENSATION IN FEET: 0

## 2025-07-23 ASSESSMENT — PAIN SCALES - GENERAL: PAINLEVEL_OUTOF10: 0-NO PAIN

## 2025-07-28 ENCOUNTER — OFFICE VISIT (OUTPATIENT)
Dept: WOUND CARE | Facility: CLINIC | Age: 60
End: 2025-07-28
Payer: COMMERCIAL

## 2025-07-28 PROCEDURE — 99212 OFFICE O/P EST SF 10 MIN: CPT

## 2025-07-28 PROCEDURE — 97602 WOUND(S) CARE NON-SELECTIVE: CPT

## 2025-07-29 NOTE — PROGRESS NOTES
"History Of Present Illness  HPI      June 21, 2024  The patient is a 59-year-old male who on Mona 10, 2024 had incision and drainage of a right inferior lateral chest wall abscess with local anesthetic at the bedside.  On CT scan the abscess appeared to extend through the right inferior lateral chest wall into the right lateral peritoneal space and pleural space.  The area improved following incision and drainage.  He was treated with IV antibiotics per infectious disease service recommendations.  He was discharged home on June 14, 2024 with Augmentin to complete a 14-day course of antibiotics.  This abscess is in the same location as the perihepatic abscess which was percutaneously drained in 2016.  The site of the incision and drainage was adjacent to the scar from his drain placement.  He was scheduled to have interventional radiology percutaneously biopsy the soft tissue in the region of the chest wall abscess in order to rule out neoplasm.  Culture from the abscess had no growth.  Fungal culture has been negative to date.  AFB culture was discontinued.  The patient reports that he feels much better.  The pain is much improved.  He still has tan fluid draining from the wound.  No fevers or chills.  He continues on antibiotics per infectious disease service.  He is scheduled for a CT chest on July 2 followed by evaluation by Dr. Hawthorne from thoracic surgery.  Biopsy by interventional radiology is scheduled July 8.  Colonoscopy is scheduled July 11.  Vascular surgery, Dr. Gotti, evaluated the patient and placed him on aspirin due to mesenteric vessel stenosis.     July 22, 2024  The patient feels \"great\".  He has no pain.  He reports that he completed his antibiotics June 28 and infectious disease service has not recommended additional antibiotics.  He continues to have some drainage from the right chest wound.  He was evaluated by Dr. Hawthorne from thoracic surgery.  He had percutaneous biopsy of the right chest wall " mass by interventional radiology on July 8, 2024.  Pathology showed soft tissue with acute inflammation, favor reactive changes.  GMS and AFB were negative for fungal and acid-fast organisms.  Colonoscopy was done on July 11, 2024 by Dr. Taylor.  Repeat CT abdomen was performed on July 2, 2024.     October 30, 2024  The patient follows up for his right lateral abdominal wall/chest wall abscess of uncertain etiology after repeat CT abdomen.  Prior PET scan was consistent with an inflammatory process.  Core biopsy showed acute inflammation.  He recently saw Dr. Hawthorne who feels that the lung nodule is unlikely to represent a malignant process.  After discussion the patient agrees to observational management with repeat CT chest in 6 to 9 months.  The patient has no complaints.  No pain.  No drainage at the site of his wound.  No nausea or vomiting.     January 10, 2025  2 weeks ago the patient noted some pink skin in the periumbilical region along the scar from a prior laparotomy done in 2016.  3 days ago the area became tender.  2 days ago it spontaneously drained.  The pain is now gone.     January 22, 2025  Status post incision and drainage on January 10, 2025 of an abdominal wall abscess in the periumbilical region at the site of prior laparotomy.  No foreign body was identified.  Purulent fluid was drained and cultured.  Culture grew rare mixed skin microorganisms.  No complaints.  The wound is still draining.  No pain.    July 30, 2025  The patient follows up after having repeat CT abdomen.  He reports that the right flank wound reopened in mid June.  He has been following at the wound center and doing dressing changes.  He reports that a culture of the drainage fluid was negative.  He has no abdominal pain.  No nausea or vomiting.  He recently saw Dr. Hawthorne for reevaluation of the right chest abnormality.    Past medical history:  Laparoscopic appendectomy in 2013 for a perforated appendix at [facility].  Perihepatic abscess percutaneously drained in October 2016.  Laparotomy with excision of urachal cyst at [facility] in November 2016.  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), Other conditions influencing health status (08/22/2016), Other shoulder lesions, right shoulder (06/29/2016), Pain in right knee (04/04/2022), Personal history of diseases of the blood and blood-forming organs and certain disorders involving the immune mechanism (04/17/2017), Personal history of diseases of the skin and subcutaneous tissue (08/16/2017), Personal history of other diseases of the digestive system, Personal history of other diseases of the musculoskeletal system and connective tissue (01/19/2015), Personal history of other diseases of the respiratory system (02/15/2016), Plantar fascial fibromatosis (12/12/2014), Postnasal drip (08/12/2016), Rash and other nonspecific skin eruption (11/08/2022), Umbilical hernia with obstruction, without gangrene (08/17/2016), Unspecified otitis externa, unspecified ear (09/21/2018), and Unspecified rotator cuff tear or rupture of right shoulder, not specified as traumatic (04/17/2017).    Surgical History  He has a past surgical history that includes Colonoscopy (08/02/2017); Vasectomy (08/02/2017); Other surgical history (08/02/2017); Hernia repair (01/08/2014); and Appendectomy (01/08/2014).     Allergies  Patient has no known allergies.    Social History  He reports that he has never smoked. He has never been exposed to tobacco smoke. He has never used smokeless  "tobacco. He reports current alcohol use of about 2.0 - 6.0 standard drinks of alcohol per week. He reports that he does not use drugs.    Family History  Family History[1]    Last Recorded Vitals  Blood pressure 137/82, pulse 74, temperature 36.4 °C (97.5 °F), temperature source Temporal, resp. rate 17, height 1.803 m (5' 11\"), weight 106 kg (234 lb 6.4 oz), SpO2 97%.    Physical Exam  Constitutional: Well-developed, well-nourished, alert and oriented, no acute distress  Skin: Warm and dry, no lesions, no rashes, no jaundice  HEENT: Normocephalic, atraumatic, EOMI, no scleral icterus, eyes have no redness or swelling or discharge, external inspection of ears and nose is normal, mucous membranes moist  Neck: Soft, nontender, no mass or adenopathy  Cardiac: Regular rate and rhythm, no murmur  Chest: Patent airway, clear to auscultation, normal breath sounds with good chest expansion, no wheezes or rales or rhonchi noted, thorax symmetric  Abdomen: Nondistended, positive bowel sounds, soft, nontender, no mass  Right chest wall/flank 5 mm diameter open wound with minimal drainage.  Nontender, no erythema.  Rectal: Not performed  Extremities: No injury, no lower extremity edema or calf tenderness  Lymphatic: No cervical adenopathy  Musculoskeletal: Range of motion intact, no joint swelling, normal strength  Neurological: Alert and oriented x3, intact sensory and motor function, no obvious focal neurologic abnormalities, normal gait  Psychological: Appropriate mood and behavior    Relevant Results  I reviewed the CT abdomen report and images from July 24, 2025:  IMPRESSION:  1.  Stable posterior right abdominal wall fluid collection, unchanged  since 10/23/2024.  2. Hepatomegaly and diffuse steatosis.    Assessment/Plan   Diagnoses and all orders for this visit:  Right upper quadrant abscess (Multi)  -     CT abdomen w IV contrast; Future  Abdominal wall abscess  Right upper quadrant/right abdominal wall chronic abscess " unchanged.  Patient has minimal symptoms.  Continue observation.  Patient reports that he has follow-up CT chest ordered for July 22, 2026.  Plan CT abdomen to be performed at same time.  Follow-up after CT scan completed or sooner if any problems.        Mook Segovia MD         [1]   Family History  Problem Relation Name Age of Onset    Diabetes Mother      Other (abestos exposure) Father      Lung cancer Father

## 2025-07-30 ENCOUNTER — APPOINTMENT (OUTPATIENT)
Dept: SURGERY | Facility: CLINIC | Age: 60
End: 2025-07-30
Payer: COMMERCIAL

## 2025-07-30 VITALS
DIASTOLIC BLOOD PRESSURE: 82 MMHG | RESPIRATION RATE: 17 BRPM | OXYGEN SATURATION: 97 % | HEART RATE: 74 BPM | WEIGHT: 234.4 LBS | SYSTOLIC BLOOD PRESSURE: 137 MMHG | TEMPERATURE: 97.5 F | HEIGHT: 71 IN | BODY MASS INDEX: 32.81 KG/M2

## 2025-07-30 DIAGNOSIS — K65.1: Primary | ICD-10-CM

## 2025-07-30 DIAGNOSIS — L02.211 ABDOMINAL WALL ABSCESS: ICD-10-CM

## 2025-07-30 PROCEDURE — 3008F BODY MASS INDEX DOCD: CPT | Performed by: SURGERY

## 2025-07-30 PROCEDURE — 1036F TOBACCO NON-USER: CPT | Performed by: SURGERY

## 2025-07-30 PROCEDURE — 4010F ACE/ARB THERAPY RXD/TAKEN: CPT | Performed by: SURGERY

## 2025-07-30 PROCEDURE — 99213 OFFICE O/P EST LOW 20 MIN: CPT | Performed by: SURGERY

## 2025-07-30 PROCEDURE — 3079F DIAST BP 80-89 MM HG: CPT | Performed by: SURGERY

## 2025-07-30 PROCEDURE — 3075F SYST BP GE 130 - 139MM HG: CPT | Performed by: SURGERY

## 2025-07-30 ASSESSMENT — PAIN SCALES - GENERAL: PAINLEVEL_OUTOF10: 0-NO PAIN

## 2025-07-30 NOTE — LETTER
July 30, 2025     Josse Al MD  [address]  [facility] [address]    Patient: Saul Colon   YOB: 1965   Date of Visit: 7/30/2025       Dear Dr. Josse Al MD:    Thank you for referring Saul Colon to me for evaluation. Below are my notes for this consultation.  If you have questions, please do not hesitate to call me. I look forward to following your patient along with you.       Sincerely,     Mook Segovia MD      CC: No Recipients  ______________________________________________________________________________________    History Of Present Illness  HPI      June 21, 2024  The patient is a 59-year-old male who on Mona 10, 2024 had incision and drainage of a right inferior lateral chest wall abscess with local anesthetic at the bedside.  On CT scan the abscess appeared to extend through the right inferior lateral chest wall into the right lateral peritoneal space and pleural space.  The area improved following incision and drainage.  He was treated with IV antibiotics per infectious disease service recommendations.  He was discharged home on June 14, 2024 with Augmentin to complete a 14-day course of antibiotics.  This abscess is in the same location as the perihepatic abscess which was percutaneously drained in 2016.  The site of the incision and drainage was adjacent to the scar from his drain placement.  He was scheduled to have interventional radiology percutaneously biopsy the soft tissue in the region of the chest wall abscess in order to rule out neoplasm.  Culture from the abscess had no growth.  Fungal culture has been negative to date.  AFB culture was discontinued.  The patient reports that he feels much better.  The pain is much improved.  He still has tan fluid draining from the wound.  No fevers or chills.  He continues on antibiotics per infectious disease service.  He is scheduled for a CT chest on July 2 followed by evaluation by   "Christoph from thoracic surgery.  Biopsy by interventional radiology is scheduled July 8.  Colonoscopy is scheduled July 11.  Vascular surgery, Dr. Gotti, evaluated the patient and placed him on aspirin due to mesenteric vessel stenosis.     July 22, 2024  The patient feels \"great\".  He has no pain.  He reports that he completed his antibiotics June 28 and infectious disease service has not recommended additional antibiotics.  He continues to have some drainage from the right chest wound.  He was evaluated by Dr. Hawthorne from thoracic surgery.  He had percutaneous biopsy of the right chest wall mass by interventional radiology on July 8, 2024.  Pathology showed soft tissue with acute inflammation, favor reactive changes.  GMS and AFB were negative for fungal and acid-fast organisms.  Colonoscopy was done on July 11, 2024 by Dr. Taylor.  Repeat CT abdomen was performed on July 2, 2024.     October 30, 2024  The patient follows up for his right lateral abdominal wall/chest wall abscess of uncertain etiology after repeat CT abdomen.  Prior PET scan was consistent with an inflammatory process.  Core biopsy showed acute inflammation.  He recently saw Dr. Hawthorne who feels that the lung nodule is unlikely to represent a malignant process.  After discussion the patient agrees to observational management with repeat CT chest in 6 to 9 months.  The patient has no complaints.  No pain.  No drainage at the site of his wound.  No nausea or vomiting.     January 10, 2025  2 weeks ago the patient noted some pink skin in the periumbilical region along the scar from a prior laparotomy done in 2016.  3 days ago the area became tender.  2 days ago it spontaneously drained.  The pain is now gone.     January 22, 2025  Status post incision and drainage on January 10, 2025 of an abdominal wall abscess in the periumbilical region at the site of prior laparotomy.  No foreign body was identified.  Purulent fluid was drained and cultured.  Culture " grew rare mixed skin microorganisms.  No complaints.  The wound is still draining.  No pain.    July 30, 2025  The patient follows up after having repeat CT abdomen.  He reports that the right flank wound reopened in mid June.  He has been following at the wound center and doing dressing changes.  He reports that a culture of the drainage fluid was negative.  He has no abdominal pain.  No nausea or vomiting.  He recently saw Dr. Hawthorne for reevaluation of the right chest abnormality.    Past medical history:  Laparoscopic appendectomy in 2013 for a perforated appendix at [facility].  Perihepatic abscess percutaneously drained in October 2016.  Laparotomy with excision of urachal cyst at [facility] in November 2016.  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), Other conditions influencing health status (08/22/2016), Other shoulder lesions, right shoulder (06/29/2016), Pain in right knee (04/04/2022), Personal history of diseases of the blood and blood-forming organs and certain disorders involving the immune mechanism (04/17/2017), Personal history of diseases of the skin and subcutaneous tissue (08/16/2017), Personal history of other diseases of the digestive system, Personal history of other diseases of the musculoskeletal system and connective tissue (01/19/2015), Personal history of other diseases of the respiratory system (02/15/2016), Plantar fascial fibromatosis (12/12/2014), Postnasal drip (08/12/2016), Rash and other nonspecific skin eruption (11/08/2022), Umbilical hernia with obstruction,  "without gangrene (08/17/2016), Unspecified otitis externa, unspecified ear (09/21/2018), and Unspecified rotator cuff tear or rupture of right shoulder, not specified as traumatic (04/17/2017).    Surgical History  He has a past surgical history that includes Colonoscopy (08/02/2017); Vasectomy (08/02/2017); Other surgical history (08/02/2017); Hernia repair (01/08/2014); and Appendectomy (01/08/2014).     Allergies  Patient has no known allergies.    Social History  He reports that he has never smoked. He has never been exposed to tobacco smoke. He has never used smokeless tobacco. He reports current alcohol use of about 2.0 - 6.0 standard drinks of alcohol per week. He reports that he does not use drugs.    Family History  Family History[1]    Last Recorded Vitals  Blood pressure 137/82, pulse 74, temperature 36.4 °C (97.5 °F), temperature source Temporal, resp. rate 17, height 1.803 m (5' 11\"), weight 106 kg (234 lb 6.4 oz), SpO2 97%.    Physical Exam  Constitutional: Well-developed, well-nourished, alert and oriented, no acute distress  Skin: Warm and dry, no lesions, no rashes, no jaundice  HEENT: Normocephalic, atraumatic, EOMI, no scleral icterus, eyes have no redness or swelling or discharge, external inspection of ears and nose is normal, mucous membranes moist  Neck: Soft, nontender, no mass or adenopathy  Cardiac: Regular rate and rhythm, no murmur  Chest: Patent airway, clear to auscultation, normal breath sounds with good chest expansion, no wheezes or rales or rhonchi noted, thorax symmetric  Abdomen: Nondistended, positive bowel sounds, soft, nontender, no mass  Right chest wall/flank 5 mm diameter open wound with minimal drainage.  Nontender, no erythema.  Rectal: Not performed  Extremities: No injury, no lower extremity edema or calf tenderness  Lymphatic: No cervical adenopathy  Musculoskeletal: Range of motion intact, no joint swelling, normal strength  Neurological: Alert and oriented x3, intact " sensory and motor function, no obvious focal neurologic abnormalities, normal gait  Psychological: Appropriate mood and behavior    Relevant Results  I reviewed the CT abdomen report and images from July 24, 2025:  IMPRESSION:  1.  Stable posterior right abdominal wall fluid collection, unchanged  since 10/23/2024.  2. Hepatomegaly and diffuse steatosis.    Assessment/Plan  Diagnoses and all orders for this visit:  Right upper quadrant abscess (Multi)  -     CT abdomen w IV contrast; Future  Abdominal wall abscess  Right upper quadrant/right abdominal wall chronic abscess unchanged.  Patient has minimal symptoms.  Continue observation.  Patient reports that he has follow-up CT chest ordered for July 22, 2026.  Plan CT abdomen to be performed at same time.  Follow-up after CT scan completed or sooner if any problems.        Mook Segovia MD         [1]  Family History  Problem Relation Name Age of Onset   • Diabetes Mother     • Other (abestos exposure) Father     • Lung cancer Father          [1]  Family History  Problem Relation Name Age of Onset   • Diabetes Mother     • Other (abestos exposure) Father     • Lung cancer Father

## 2025-08-20 ENCOUNTER — OFFICE VISIT (OUTPATIENT)
Dept: ORTHOPEDIC SURGERY | Facility: CLINIC | Age: 60
End: 2025-08-20
Payer: COMMERCIAL

## 2025-08-20 DIAGNOSIS — M25.562 CHRONIC PAIN OF LEFT KNEE: Primary | ICD-10-CM

## 2025-08-20 DIAGNOSIS — G89.29 CHRONIC PAIN OF LEFT KNEE: Primary | ICD-10-CM

## 2025-08-20 DIAGNOSIS — M17.12 ARTHRITIS OF LEFT KNEE: ICD-10-CM

## 2025-08-20 PROCEDURE — 2500000004 HC RX 250 GENERAL PHARMACY W/ HCPCS (ALT 636 FOR OP/ED): Performed by: ORTHOPAEDIC SURGERY

## 2025-08-20 PROCEDURE — 1036F TOBACCO NON-USER: CPT | Performed by: ORTHOPAEDIC SURGERY

## 2025-08-20 PROCEDURE — 99203 OFFICE O/P NEW LOW 30 MIN: CPT | Performed by: ORTHOPAEDIC SURGERY

## 2025-08-20 PROCEDURE — 4010F ACE/ARB THERAPY RXD/TAKEN: CPT | Performed by: ORTHOPAEDIC SURGERY

## 2025-08-20 PROCEDURE — 20610 DRAIN/INJ JOINT/BURSA W/O US: CPT | Mod: LT | Performed by: ORTHOPAEDIC SURGERY

## 2025-08-20 PROCEDURE — 99212 OFFICE O/P EST SF 10 MIN: CPT | Mod: 25 | Performed by: ORTHOPAEDIC SURGERY

## 2025-08-20 RX ORDER — TRIAMCINOLONE ACETONIDE 40 MG/ML
40 INJECTION, SUSPENSION INTRA-ARTICULAR; INTRAMUSCULAR
Status: COMPLETED | OUTPATIENT
Start: 2025-08-20 | End: 2025-08-20

## 2025-08-20 RX ORDER — LIDOCAINE HYDROCHLORIDE 20 MG/ML
2 INJECTION, SOLUTION INFILTRATION; PERINEURAL
Status: COMPLETED | OUTPATIENT
Start: 2025-08-20 | End: 2025-08-20

## 2025-08-20 RX ADMIN — TRIAMCINOLONE ACETONIDE 40 MG: 400 INJECTION, SUSPENSION INTRA-ARTICULAR; INTRAMUSCULAR at 16:07

## 2025-08-20 RX ADMIN — LIDOCAINE HYDROCHLORIDE 2 ML: 20 INJECTION, SOLUTION INFILTRATION; PERINEURAL at 16:07

## 2025-08-20 ASSESSMENT — ENCOUNTER SYMPTOMS
KNEE SWELLING: 1
KNEE DEFORMITY: 1

## 2025-08-21 ENCOUNTER — TELEPHONE (OUTPATIENT)
Dept: CARE COORDINATION | Age: 60
End: 2025-08-21
Payer: COMMERCIAL

## 2025-08-25 ENCOUNTER — TELEPHONE (OUTPATIENT)
Dept: CARE COORDINATION | Age: 60
End: 2025-08-25
Payer: COMMERCIAL

## 2025-08-27 ENCOUNTER — TELEPHONE (OUTPATIENT)
Dept: CARE COORDINATION | Age: 60
End: 2025-08-27

## 2025-08-27 ENCOUNTER — OFFICE VISIT (OUTPATIENT)
Dept: SURGERY | Facility: CLINIC | Age: 60
End: 2025-08-27
Payer: COMMERCIAL

## 2025-08-27 VITALS
BODY MASS INDEX: 32.76 KG/M2 | SYSTOLIC BLOOD PRESSURE: 121 MMHG | TEMPERATURE: 97.7 F | RESPIRATION RATE: 17 BRPM | HEIGHT: 71 IN | DIASTOLIC BLOOD PRESSURE: 71 MMHG | WEIGHT: 234 LBS | OXYGEN SATURATION: 96 % | HEART RATE: 103 BPM

## 2025-08-27 DIAGNOSIS — K65.1: Primary | ICD-10-CM

## 2025-08-27 DIAGNOSIS — L02.211 ABSCESS OF FLANK: Primary | ICD-10-CM

## 2025-08-27 DIAGNOSIS — K65.1: ICD-10-CM

## 2025-08-27 PROCEDURE — 3074F SYST BP LT 130 MM HG: CPT | Performed by: SURGERY

## 2025-08-27 PROCEDURE — 3078F DIAST BP <80 MM HG: CPT | Performed by: SURGERY

## 2025-08-27 PROCEDURE — 3008F BODY MASS INDEX DOCD: CPT | Performed by: SURGERY

## 2025-08-27 PROCEDURE — 99212 OFFICE O/P EST SF 10 MIN: CPT | Performed by: SURGERY

## 2025-08-27 PROCEDURE — 4010F ACE/ARB THERAPY RXD/TAKEN: CPT | Performed by: SURGERY

## 2025-08-27 PROCEDURE — 10060 I&D ABSCESS SIMPLE/SINGLE: CPT | Performed by: SURGERY

## 2025-08-27 RX ORDER — AMOXICILLIN AND CLAVULANATE POTASSIUM 875; 125 MG/1; MG/1
1 TABLET, FILM COATED ORAL 2 TIMES DAILY
Qty: 14 TABLET | Refills: 0 | Status: SHIPPED | OUTPATIENT
Start: 2025-08-27 | End: 2025-09-03

## 2025-08-29 LAB
BACTERIA SPEC CULT: ABNORMAL
GRAM STN SPEC: ABNORMAL
GRAM STN SPEC: ABNORMAL

## 2025-09-10 ENCOUNTER — APPOINTMENT (OUTPATIENT)
Dept: SURGERY | Facility: CLINIC | Age: 60
End: 2025-09-10
Payer: COMMERCIAL

## 2025-09-26 ENCOUNTER — APPOINTMENT (OUTPATIENT)
Dept: PRIMARY CARE | Facility: CLINIC | Age: 60
End: 2025-09-26
Payer: COMMERCIAL

## 2026-07-29 ENCOUNTER — APPOINTMENT (OUTPATIENT)
Dept: SURGERY | Facility: CLINIC | Age: 61
End: 2026-07-29
Payer: COMMERCIAL